# Patient Record
Sex: FEMALE | URBAN - METROPOLITAN AREA
[De-identification: names, ages, dates, MRNs, and addresses within clinical notes are randomized per-mention and may not be internally consistent; named-entity substitution may affect disease eponyms.]

---

## 2019-07-02 ENCOUNTER — INPATIENT (INPATIENT)
Facility: HOSPITAL | Age: 69
LOS: 4 days | Discharge: HOME | End: 2019-07-07
Attending: SURGERY | Admitting: SURGERY
Payer: MEDICAID

## 2019-07-02 VITALS
DIASTOLIC BLOOD PRESSURE: 75 MMHG | RESPIRATION RATE: 16 BRPM | TEMPERATURE: 98 F | WEIGHT: 169.98 LBS | SYSTOLIC BLOOD PRESSURE: 124 MMHG | OXYGEN SATURATION: 100 % | HEIGHT: 61 IN | HEART RATE: 79 BPM

## 2019-07-02 DIAGNOSIS — K81.0 ACUTE CHOLECYSTITIS: ICD-10-CM

## 2019-07-02 DIAGNOSIS — I10 ESSENTIAL (PRIMARY) HYPERTENSION: ICD-10-CM

## 2019-07-02 LAB
ALBUMIN SERPL ELPH-MCNC: 3 G/DL — LOW (ref 3.3–5)
ALP SERPL-CCNC: 100 U/L — SIGNIFICANT CHANGE UP (ref 40–120)
ALT FLD-CCNC: 91 U/L — HIGH (ref 12–78)
ANION GAP SERPL CALC-SCNC: 6 MMOL/L — SIGNIFICANT CHANGE UP (ref 5–17)
APPEARANCE UR: CLEAR — SIGNIFICANT CHANGE UP
APTT BLD: 29.5 SEC — SIGNIFICANT CHANGE UP (ref 27.5–36.3)
AST SERPL-CCNC: 233 U/L — HIGH (ref 15–37)
BASOPHILS # BLD AUTO: 0.04 K/UL — SIGNIFICANT CHANGE UP (ref 0–0.2)
BASOPHILS NFR BLD AUTO: 0.6 % — SIGNIFICANT CHANGE UP (ref 0–2)
BILIRUB SERPL-MCNC: 0.5 MG/DL — SIGNIFICANT CHANGE UP (ref 0.2–1.2)
BILIRUB UR-MCNC: NEGATIVE — SIGNIFICANT CHANGE UP
BLD GP AB SCN SERPL QL: SIGNIFICANT CHANGE UP
BUN SERPL-MCNC: 13 MG/DL — SIGNIFICANT CHANGE UP (ref 7–23)
CALCIUM SERPL-MCNC: 8.5 MG/DL — SIGNIFICANT CHANGE UP (ref 8.5–10.1)
CHLORIDE SERPL-SCNC: 101 MMOL/L — SIGNIFICANT CHANGE UP (ref 96–108)
CK MB BLD-MCNC: <1.2 % — SIGNIFICANT CHANGE UP (ref 0–3.5)
CK MB CFR SERPL CALC: <1 NG/ML — SIGNIFICANT CHANGE UP (ref 0.5–3.6)
CK SERPL-CCNC: 83 U/L — SIGNIFICANT CHANGE UP (ref 26–192)
CO2 SERPL-SCNC: 30 MMOL/L — SIGNIFICANT CHANGE UP (ref 22–31)
COLOR SPEC: YELLOW — SIGNIFICANT CHANGE UP
CREAT SERPL-MCNC: 0.95 MG/DL — SIGNIFICANT CHANGE UP (ref 0.5–1.3)
DIFF PNL FLD: NEGATIVE — SIGNIFICANT CHANGE UP
EOSINOPHIL # BLD AUTO: 0.12 K/UL — SIGNIFICANT CHANGE UP (ref 0–0.5)
EOSINOPHIL NFR BLD AUTO: 1.7 % — SIGNIFICANT CHANGE UP (ref 0–6)
GLUCOSE SERPL-MCNC: 128 MG/DL — HIGH (ref 70–99)
GLUCOSE UR QL: NEGATIVE MG/DL — SIGNIFICANT CHANGE UP
HCT VFR BLD CALC: 37.6 % — SIGNIFICANT CHANGE UP (ref 34.5–45)
HGB BLD-MCNC: 12.4 G/DL — SIGNIFICANT CHANGE UP (ref 11.5–15.5)
IMM GRANULOCYTES NFR BLD AUTO: 0.4 % — SIGNIFICANT CHANGE UP (ref 0–1.5)
INR BLD: 1 RATIO — SIGNIFICANT CHANGE UP (ref 0.88–1.16)
KETONES UR-MCNC: NEGATIVE — SIGNIFICANT CHANGE UP
LACTATE SERPL-SCNC: 2 MMOL/L — SIGNIFICANT CHANGE UP (ref 0.7–2)
LACTATE SERPL-SCNC: 2.1 MMOL/L — HIGH (ref 0.7–2)
LEUKOCYTE ESTERASE UR-ACNC: NEGATIVE — SIGNIFICANT CHANGE UP
LIDOCAIN IGE QN: 201 U/L — SIGNIFICANT CHANGE UP (ref 73–393)
LYMPHOCYTES # BLD AUTO: 0.85 K/UL — LOW (ref 1–3.3)
LYMPHOCYTES # BLD AUTO: 11.7 % — LOW (ref 13–44)
MCHC RBC-ENTMCNC: 28.1 PG — SIGNIFICANT CHANGE UP (ref 27–34)
MCHC RBC-ENTMCNC: 33 GM/DL — SIGNIFICANT CHANGE UP (ref 32–36)
MCV RBC AUTO: 85.3 FL — SIGNIFICANT CHANGE UP (ref 80–100)
MONOCYTES # BLD AUTO: 0.41 K/UL — SIGNIFICANT CHANGE UP (ref 0–0.9)
MONOCYTES NFR BLD AUTO: 5.7 % — SIGNIFICANT CHANGE UP (ref 2–14)
NEUTROPHILS # BLD AUTO: 5.8 K/UL — SIGNIFICANT CHANGE UP (ref 1.8–7.4)
NEUTROPHILS NFR BLD AUTO: 79.9 % — HIGH (ref 43–77)
NITRITE UR-MCNC: NEGATIVE — SIGNIFICANT CHANGE UP
NRBC # BLD: 0 /100 WBCS — SIGNIFICANT CHANGE UP (ref 0–0)
PH UR: 9 — HIGH (ref 5–8)
PLATELET # BLD AUTO: 245 K/UL — SIGNIFICANT CHANGE UP (ref 150–400)
POTASSIUM SERPL-MCNC: 3.7 MMOL/L — SIGNIFICANT CHANGE UP (ref 3.5–5.3)
POTASSIUM SERPL-SCNC: 3.7 MMOL/L — SIGNIFICANT CHANGE UP (ref 3.5–5.3)
PROT SERPL-MCNC: 7.2 GM/DL — SIGNIFICANT CHANGE UP (ref 6–8.3)
PROT UR-MCNC: NEGATIVE MG/DL — SIGNIFICANT CHANGE UP
PROTHROM AB SERPL-ACNC: 11.2 SEC — SIGNIFICANT CHANGE UP (ref 10–12.9)
RBC # BLD: 4.41 M/UL — SIGNIFICANT CHANGE UP (ref 3.8–5.2)
RBC # FLD: 14.4 % — SIGNIFICANT CHANGE UP (ref 10.3–14.5)
SODIUM SERPL-SCNC: 137 MMOL/L — SIGNIFICANT CHANGE UP (ref 135–145)
SP GR SPEC: 1.01 — SIGNIFICANT CHANGE UP (ref 1.01–1.02)
TROPONIN I SERPL-MCNC: <.015 NG/ML — SIGNIFICANT CHANGE UP (ref 0.01–0.04)
UROBILINOGEN FLD QL: NEGATIVE MG/DL — SIGNIFICANT CHANGE UP
WBC # BLD: 7.25 K/UL — SIGNIFICANT CHANGE UP (ref 3.8–10.5)
WBC # FLD AUTO: 7.25 K/UL — SIGNIFICANT CHANGE UP (ref 3.8–10.5)

## 2019-07-02 PROCEDURE — 99285 EMERGENCY DEPT VISIT HI MDM: CPT

## 2019-07-02 PROCEDURE — 93010 ELECTROCARDIOGRAM REPORT: CPT

## 2019-07-02 PROCEDURE — 74177 CT ABD & PELVIS W/CONTRAST: CPT | Mod: 26

## 2019-07-02 PROCEDURE — 99053 MED SERV 10PM-8AM 24 HR FAC: CPT

## 2019-07-02 PROCEDURE — 76700 US EXAM ABDOM COMPLETE: CPT | Mod: 26

## 2019-07-02 PROCEDURE — 99223 1ST HOSP IP/OBS HIGH 75: CPT

## 2019-07-02 RX ORDER — SODIUM CHLORIDE 9 MG/ML
1000 INJECTION, SOLUTION INTRAVENOUS
Refills: 0 | Status: DISCONTINUED | OUTPATIENT
Start: 2019-07-02 | End: 2019-07-03

## 2019-07-02 RX ORDER — PIPERACILLIN AND TAZOBACTAM 4; .5 G/20ML; G/20ML
3.38 INJECTION, POWDER, LYOPHILIZED, FOR SOLUTION INTRAVENOUS ONCE
Refills: 0 | Status: COMPLETED | OUTPATIENT
Start: 2019-07-02 | End: 2019-07-02

## 2019-07-02 RX ORDER — SODIUM CHLORIDE 9 MG/ML
1000 INJECTION INTRAMUSCULAR; INTRAVENOUS; SUBCUTANEOUS ONCE
Refills: 0 | Status: COMPLETED | OUTPATIENT
Start: 2019-07-02 | End: 2019-07-02

## 2019-07-02 RX ORDER — MORPHINE SULFATE 50 MG/1
2 CAPSULE, EXTENDED RELEASE ORAL EVERY 4 HOURS
Refills: 0 | Status: DISCONTINUED | OUTPATIENT
Start: 2019-07-02 | End: 2019-07-02

## 2019-07-02 RX ORDER — SIMETHICONE 80 MG/1
80 TABLET, CHEWABLE ORAL ONCE
Refills: 0 | Status: COMPLETED | OUTPATIENT
Start: 2019-07-02 | End: 2019-07-02

## 2019-07-02 RX ORDER — HYDROCHLOROTHIAZIDE 25 MG
25 TABLET ORAL DAILY
Refills: 0 | Status: DISCONTINUED | OUTPATIENT
Start: 2019-07-02 | End: 2019-07-06

## 2019-07-02 RX ORDER — HYDROMORPHONE HYDROCHLORIDE 2 MG/ML
0.5 INJECTION INTRAMUSCULAR; INTRAVENOUS; SUBCUTANEOUS
Refills: 0 | Status: DISCONTINUED | OUTPATIENT
Start: 2019-07-02 | End: 2019-07-06

## 2019-07-02 RX ORDER — PANTOPRAZOLE SODIUM 20 MG/1
40 TABLET, DELAYED RELEASE ORAL ONCE
Refills: 0 | Status: COMPLETED | OUTPATIENT
Start: 2019-07-02 | End: 2019-07-02

## 2019-07-02 RX ORDER — PIPERACILLIN AND TAZOBACTAM 4; .5 G/20ML; G/20ML
3.38 INJECTION, POWDER, LYOPHILIZED, FOR SOLUTION INTRAVENOUS EVERY 8 HOURS
Refills: 0 | Status: COMPLETED | OUTPATIENT
Start: 2019-07-02 | End: 2019-07-05

## 2019-07-02 RX ORDER — SODIUM CHLORIDE 9 MG/ML
1000 INJECTION INTRAMUSCULAR; INTRAVENOUS; SUBCUTANEOUS
Refills: 0 | Status: DISCONTINUED | OUTPATIENT
Start: 2019-07-02 | End: 2019-07-02

## 2019-07-02 RX ORDER — ONDANSETRON 8 MG/1
4 TABLET, FILM COATED ORAL EVERY 6 HOURS
Refills: 0 | Status: DISCONTINUED | OUTPATIENT
Start: 2019-07-02 | End: 2019-07-06

## 2019-07-02 RX ORDER — HEPARIN SODIUM 5000 [USP'U]/ML
5000 INJECTION INTRAVENOUS; SUBCUTANEOUS EVERY 12 HOURS
Refills: 0 | Status: DISCONTINUED | OUTPATIENT
Start: 2019-07-02 | End: 2019-07-06

## 2019-07-02 RX ADMIN — SODIUM CHLORIDE 125 MILLILITER(S): 9 INJECTION, SOLUTION INTRAVENOUS at 20:15

## 2019-07-02 RX ADMIN — PIPERACILLIN AND TAZOBACTAM 200 GRAM(S): 4; .5 INJECTION, POWDER, LYOPHILIZED, FOR SOLUTION INTRAVENOUS at 13:45

## 2019-07-02 RX ADMIN — SIMETHICONE 80 MILLIGRAM(S): 80 TABLET, CHEWABLE ORAL at 07:25

## 2019-07-02 RX ADMIN — PIPERACILLIN AND TAZOBACTAM 25 GRAM(S): 4; .5 INJECTION, POWDER, LYOPHILIZED, FOR SOLUTION INTRAVENOUS at 22:07

## 2019-07-02 RX ADMIN — SODIUM CHLORIDE 125 MILLILITER(S): 9 INJECTION INTRAMUSCULAR; INTRAVENOUS; SUBCUTANEOUS at 13:45

## 2019-07-02 RX ADMIN — SODIUM CHLORIDE 1000 MILLILITER(S): 9 INJECTION INTRAMUSCULAR; INTRAVENOUS; SUBCUTANEOUS at 09:07

## 2019-07-02 RX ADMIN — SODIUM CHLORIDE 1000 MILLILITER(S): 9 INJECTION INTRAMUSCULAR; INTRAVENOUS; SUBCUTANEOUS at 07:22

## 2019-07-02 RX ADMIN — PANTOPRAZOLE SODIUM 40 MILLIGRAM(S): 20 TABLET, DELAYED RELEASE ORAL at 07:25

## 2019-07-02 NOTE — ED ADULT NURSE REASSESSMENT NOTE - NS ED NURSE REASSESS COMMENT FT1
Assuming care from previous RN Susy NEWELL, pt is A&OX4, pt safety maintained, denies SOB, resp even and unlabored, skin warm and dry, color is normal for race, denies pain/n/v, pt aware of plan of care and proficiency determined by successful teach back demonstration. Pt does not appear to be in any distress at this time.

## 2019-07-02 NOTE — H&P ADULT - HISTORY OF PRESENT ILLNESS
Patient is a 69 year old female with PMH HTN who presents to the ER c/o 7/10 epigastric pain and RUQ pain since last night. Admits to associated nausea, denies vomiting. Reports 3 month history of similar episodes for which she did not seek care. Denies fever, chills, chest pain, sob.  Last meal last night.

## 2019-07-02 NOTE — ED ADULT NURSE REASSESSMENT NOTE - NS ED NURSE REASSESS COMMENT FT1
Report given to holding NANCY Dallas, pts history, current condition and reason for admission discussed, safety concerns addressed and reviewed, pt currently in stable condition, IV flushes for patency and site shows no signs or symptoms of infiltrate, dressing is clean dry and intact, pt is aware of plan of care. Pt education deemed successful at time of report after patient demonstrates successful teach back for proficiency.

## 2019-07-02 NOTE — ED PROVIDER NOTE - OBJECTIVE STATEMENT
Pertinent PMH/PSH/FHx/SHx and Review of Systems contained within:    70yo F w PMH of HTN, no previous surgical hx presents to ED for eval of abd pain.  Pt states she has epigastric pain, which radiates all over abdomen.  Pt had mild pain starting 3d ago, then was much worse this morning.  Denies fever, chills, CP, SOB, vomiting, diarrhea, urinary sx, recent travel, trauma, ingestion of spoiled foods.  Pt states she has experiences this pain in the past, work up negative.    No fever/chills, No photophobia/eye pain/changes in vision, No ear pain/sore throat/dysphagia, No chest pain/palpitations, no SOB/cough/wheeze/stridor, +abdominal pain, No neck/back pain, no rash, no changes in neurological status/function.

## 2019-07-02 NOTE — H&P ADULT - PROBLEM SELECTOR PLAN 1
Admit  IV antibiotics  Preop for OR today; NPO, EKG, labs  IV hydration  Pain management PRN  Antiemetics PRN  DVT ppx  Discussed with Dr. Ferrara Admit  IV antibiotics  Preop for cholecystectomy; NPO, EKG, labs  IV hydration  Pain management PRN  Antiemetics PRN  DVT ppx  Discussed with Dr. Ferrara

## 2019-07-02 NOTE — ED ADULT TRIAGE NOTE - CHIEF COMPLAINT QUOTE
pt co abdominal pain x 2 hours. denies nvd. pt reports this has happened several times before and they are never able to find anything. pt reports vegetable rice for dinner. hx of htn.

## 2019-07-02 NOTE — ED ADULT NURSE NOTE - ED STAT RN HANDOFF DETAILS
Report endorsed to oncoming RN. Safety checks compld this shift/Safety rounds completed hourly.  IV sites checked Q2+remains WDL.  Fall +skin precs in place. Any issues endorsed to oncoming RN for follow up.

## 2019-07-02 NOTE — ED PROVIDER NOTE - PHYSICAL EXAMINATION
Gen: Alert, c/o discomfort  Head: NC, AT, EOMI, normal lids/conjunctiva  ENT: normal hearing, patent oropharynx, MMM  Neck: supple, no tenderness/meningismus, FROM, Trachea midline  Pulm: Bilateral clear BS, normal resp effort, no wheeze/stridor/retractions  CV: RRR, no M/R/G, +dist pulses  Abd: soft, +epigastric TTP, ND, +BS, no guarding/rebound tenderness  Mskel: no edema/erythema/cyanosis  Skin: no rash  Neuro: no sensory/motor deficits

## 2019-07-02 NOTE — H&P ADULT - RS GEN PE MLT RESP DETAILS PC
breath sounds equal/no rhonchi/clear to auscultation bilaterally/no rales/no wheezes/respirations non-labored

## 2019-07-02 NOTE — ED PROVIDER NOTE - CLINICAL SUMMARY MEDICAL DECISION MAKING FREE TEXT BOX
Pt w abd pain, pending CT & rpt lact.  Patient care transitioned to incoming team.  All decisions regarding the progression of care will be made at their discretion.

## 2019-07-02 NOTE — ED PROVIDER NOTE - PROGRESS NOTE DETAILS
pt signed out to me from dr hernandez, pt presented with epigastric abdominal pain, ct shows possible cholecystitis, sono ordered pt admitted to surgery

## 2019-07-02 NOTE — ED PROVIDER NOTE - CARE PLAN
Principal Discharge DX:	Abdominal pain Principal Discharge DX:	Abdominal pain  Secondary Diagnosis:	Acute cholecystitis

## 2019-07-02 NOTE — ED ADULT NURSE NOTE - OBJECTIVE STATEMENT
pt received to bed 29 c/o generalized abdominal pain starting at 3 AM this morning. pt states the pain has happened before and they cannot find a cause of the pain. denies: headache, dizzy, fever, chills, n/v, chest pain, SOB, burning on urination, dysuria. pt is rubbing abdomen and moaning in pain. abdomen is nontender upon palpation. family at bedside.

## 2019-07-02 NOTE — H&P ADULT - ATTENDING COMMENTS
The recent imaging studies, (including CT scan and abdominal ultrasound) report and images were reviewed and reveal cholelithiasis, gallbladder wall thickening/edema, and pericholecystic inflammation, consistent with acute cholecystitis.  Keep NPO/IVF  Continue Zosyn  Cholecystectomy booked for tomorrow afternoon.  Repeat LFTs in AM.

## 2019-07-03 DIAGNOSIS — R94.5 ABNORMAL RESULTS OF LIVER FUNCTION STUDIES: ICD-10-CM

## 2019-07-03 LAB
ALBUMIN SERPL ELPH-MCNC: 3.1 G/DL — LOW (ref 3.3–5)
ALP SERPL-CCNC: 219 U/L — HIGH (ref 40–120)
ALT FLD-CCNC: 1104 U/L — HIGH (ref 12–78)
ANION GAP SERPL CALC-SCNC: 7 MMOL/L — SIGNIFICANT CHANGE UP (ref 5–17)
AST SERPL-CCNC: 1513 U/L — HIGH (ref 15–37)
BILIRUB DIRECT SERPL-MCNC: 1.45 MG/DL — HIGH (ref 0.05–0.2)
BILIRUB INDIRECT FLD-MCNC: 0.6 MG/DL — SIGNIFICANT CHANGE UP (ref 0.2–1)
BILIRUB SERPL-MCNC: 2.1 MG/DL — HIGH (ref 0.2–1.2)
BUN SERPL-MCNC: 8 MG/DL — SIGNIFICANT CHANGE UP (ref 7–23)
CALCIUM SERPL-MCNC: 8.9 MG/DL — SIGNIFICANT CHANGE UP (ref 8.5–10.1)
CHLORIDE SERPL-SCNC: 106 MMOL/L — SIGNIFICANT CHANGE UP (ref 96–108)
CO2 SERPL-SCNC: 27 MMOL/L — SIGNIFICANT CHANGE UP (ref 22–31)
CREAT SERPL-MCNC: 0.89 MG/DL — SIGNIFICANT CHANGE UP (ref 0.5–1.3)
GLUCOSE SERPL-MCNC: 119 MG/DL — HIGH (ref 70–99)
HCT VFR BLD CALC: 38.7 % — SIGNIFICANT CHANGE UP (ref 34.5–45)
HCV AB S/CO SERPL IA: 0.09 S/CO — SIGNIFICANT CHANGE UP (ref 0–0.99)
HCV AB SERPL-IMP: SIGNIFICANT CHANGE UP
HGB BLD-MCNC: 12.6 G/DL — SIGNIFICANT CHANGE UP (ref 11.5–15.5)
MAGNESIUM SERPL-MCNC: 2.9 MG/DL — HIGH (ref 1.6–2.6)
MCHC RBC-ENTMCNC: 28 PG — SIGNIFICANT CHANGE UP (ref 27–34)
MCHC RBC-ENTMCNC: 32.6 GM/DL — SIGNIFICANT CHANGE UP (ref 32–36)
MCV RBC AUTO: 86 FL — SIGNIFICANT CHANGE UP (ref 80–100)
NRBC # BLD: 0 /100 WBCS — SIGNIFICANT CHANGE UP (ref 0–0)
PHOSPHATE SERPL-MCNC: 3.7 MG/DL — SIGNIFICANT CHANGE UP (ref 2.5–4.5)
PLATELET # BLD AUTO: 266 K/UL — SIGNIFICANT CHANGE UP (ref 150–400)
POTASSIUM SERPL-MCNC: 3.5 MMOL/L — SIGNIFICANT CHANGE UP (ref 3.5–5.3)
POTASSIUM SERPL-SCNC: 3.5 MMOL/L — SIGNIFICANT CHANGE UP (ref 3.5–5.3)
PROT SERPL-MCNC: 7.2 GM/DL — SIGNIFICANT CHANGE UP (ref 6–8.3)
RBC # BLD: 4.5 M/UL — SIGNIFICANT CHANGE UP (ref 3.8–5.2)
RBC # FLD: 14.8 % — HIGH (ref 10.3–14.5)
SODIUM SERPL-SCNC: 140 MMOL/L — SIGNIFICANT CHANGE UP (ref 135–145)
WBC # BLD: 3.58 K/UL — LOW (ref 3.8–10.5)
WBC # FLD AUTO: 3.58 K/UL — LOW (ref 3.8–10.5)

## 2019-07-03 PROCEDURE — 74183 MRI ABD W/O CNTR FLWD CNTR: CPT | Mod: 26

## 2019-07-03 PROCEDURE — 78226 HEPATOBILIARY SYSTEM IMAGING: CPT | Mod: 26

## 2019-07-03 PROCEDURE — 99234 HOSP IP/OBS SM DT SF/LOW 45: CPT

## 2019-07-03 RX ORDER — DEXTROSE MONOHYDRATE, SODIUM CHLORIDE, AND POTASSIUM CHLORIDE 50; .745; 4.5 G/1000ML; G/1000ML; G/1000ML
1000 INJECTION, SOLUTION INTRAVENOUS
Refills: 0 | Status: DISCONTINUED | OUTPATIENT
Start: 2019-07-03 | End: 2019-07-06

## 2019-07-03 RX ADMIN — HEPARIN SODIUM 5000 UNIT(S): 5000 INJECTION INTRAVENOUS; SUBCUTANEOUS at 06:14

## 2019-07-03 RX ADMIN — PIPERACILLIN AND TAZOBACTAM 25 GRAM(S): 4; .5 INJECTION, POWDER, LYOPHILIZED, FOR SOLUTION INTRAVENOUS at 21:35

## 2019-07-03 RX ADMIN — PIPERACILLIN AND TAZOBACTAM 25 GRAM(S): 4; .5 INJECTION, POWDER, LYOPHILIZED, FOR SOLUTION INTRAVENOUS at 06:14

## 2019-07-03 RX ADMIN — HEPARIN SODIUM 5000 UNIT(S): 5000 INJECTION INTRAVENOUS; SUBCUTANEOUS at 18:23

## 2019-07-03 RX ADMIN — Medication 25 MILLIGRAM(S): at 06:15

## 2019-07-03 RX ADMIN — PIPERACILLIN AND TAZOBACTAM 25 GRAM(S): 4; .5 INJECTION, POWDER, LYOPHILIZED, FOR SOLUTION INTRAVENOUS at 14:17

## 2019-07-03 RX ADMIN — DEXTROSE MONOHYDRATE, SODIUM CHLORIDE, AND POTASSIUM CHLORIDE 100 MILLILITER(S): 50; .745; 4.5 INJECTION, SOLUTION INTRAVENOUS at 10:56

## 2019-07-03 NOTE — CONSULT NOTE ADULT - PROBLEM SELECTOR RECOMMENDATION 9
Patient is currently afebrile, normal wbc and has benign abdominal exam  More likely passed Calculus with absence of symptoms  Would continue IV antibiotics and follow LFTs closely Patient is currently afebrile, normal wbc and has benign abdominal exam  More likely passed Calculus with absence of symptoms  Would continue IV antibiotics and follow LFTs closely  Clear liquids

## 2019-07-03 NOTE — CONSULT NOTE ADULT - ASSESSMENT
68y/o female with Cholecystitis, Cholelithiasis, now with worsening LFTs ? Passed calculus vs Cholangitis

## 2019-07-03 NOTE — PROGRESS NOTE ADULT - ATTENDING COMMENTS
Patient seen and examined.  She states that she is feeling better and denies pain or nausea.  She just had her MRCP.    NAD  Abdomen - soft, non-tender, non-distended    WBC = 3.5 (7.2)  T Bili = 2.1 (0.5)  Alk Phos = 219 (100)  AST = 1513 (233)  ALT = 1104 (91)    Assessment:  69-year-old woman admitted with presumed acute cholecystitis, now with markedly elevated LFTs likely secondary to a passed stone.    Plan:  - The MRCP from today was reviewed with radiology and revealed mild intra and extrahepatic biliary dilatation which is new from her CT scan from yesterday.  There was no choledocholithiasis appreciated which overall is most consistent with a passed stone.  The gallbladder had persistent wall thickening which is unclear to be acute versus chronic.  - Will get a HIDA scan to assess for a persistent cystic duct obstruction in order to determine the timing of her cholecystectomy.  If the cystic duct obstruction has resolved, will likely plan for a semi-elective cholecystectomy once her LFTs have fully normalized.  - In the interim, keep NPO and continue Zosyn pending the results of the HIDA scan.  - F/u GI consult (Dr. Ricardo) and continue to trend LFTs.

## 2019-07-03 NOTE — CONSULT NOTE ADULT - SUBJECTIVE AND OBJECTIVE BOX
Chief Complaint:  Patient is a 69y old  Female who presents with a chief complaint of     HPI:  Patient is a 69 year old female with PMH HTN who presents to the ER c/o 7/10 epigastric pain and RUQ pain since last night. Admits to associated nausea, denies vomiting. Reports 3 month history of similar episodes for which she did not seek care. Denies fever, chills, chest pain, sob.  Last meal last night. (2019 12:55)      PMH/PSH:PAST MEDICAL & SURGICAL HISTORY:  HTN (hypertension)  No significant past surgical history      Allergies:  No Known Allergies      Medications:  dextrose 5% + sodium chloride 0.45% with potassium chloride 20 mEq/L 1000 milliLiter(s) IV Continuous <Continuous>  heparin  Injectable 5000 Unit(s) SubCutaneous every 12 hours  hydrochlorothiazide 25 milliGRAM(s) Oral daily  HYDROmorphone  Injectable 0.5 milliGRAM(s) IV Push every 3 hours PRN  ondansetron Injectable 4 milliGRAM(s) IV Push every 6 hours PRN  piperacillin/tazobactam IVPB.. 3.375 Gram(s) IV Intermittent every 8 hours      REVIEW OF SYSTEMS:  All other review of systems is negative unless indicated above.    Relevant Family History:   FAMILY HISTORY:  No pertinent family history in first degree relatives      Relevant Social History:  Denies ETOH or tobacco history    Physical Exam:    Vital Signs:  Vital Signs Last 24 Hrs  T(C): 36.7 (2019 11:00), Max: 37.3 (2019 15:03)  T(F): 98.1 (2019 11:00), Max: 99.1 (2019 15:03)  HR: 72 (2019 11:00) (72 - 77)  BP: 156/79 (2019 11:00) (117/63 - 156/79)  BP(mean): --  RR: 18 (2019 11:00) (17 - 18)  SpO2: 100% (2019 11:00) (96% - 100%)  Daily     Daily Weight in k (2019 05:29)    Constitutional: WDWN resting comfortably in bed; NAD  HEENT: NC/AT, PERRL, EOMI, anicteric sclera, no nasal discharge; uvula midline, no oropharyngeal erythema or exudates  Neck: supple; no JVD or thyromegaly  Respiratory: CTA B/L; no W/R/R, no retractions  Cardiac: +S1/S2; RRR; no M/R/G; PMI non-displaced  Gastrointestinal: soft, NT/ND; no rebound or guarding; +BS   Extremities: , no clubbing or cyanosis; no peripheral edema  Musculoskeletal:  no joint swelling, tenderness or erythema  Vascular: 2+ radial, femoral, DP/PT pulses B/L  Skin: warm, dry and intact; no rashes, wounds, or scars  Neurologic: AAOx3; CNS grossly intact; no focal deficits no asterixis, no tremor, no encephalopathy    Laboratory:                          12.6   3.58  )-----------( 266      ( 2019 06:32 )             38.7     07-    140  |  106  |  8   ----------------------------<  119<H>  3.5   |  27  |  0.89    Ca    8.9      2019 06:32  Phos  3.7       Mg     2.9     07      LIVER FUNCTIONS - ( 2019 06:32 )    TPro  7.2  /  Alb  3.1<L>  /  TBili  2.1<H>  /  DBili  1.45<H>  /  AST  1513<H>  /  ALT  1104<H>  /  AlkPhos  219<H>      PT/INR - ( 2019 05:56 )   PT: 11.2 sec;   INR: 1.00 ratio        PTT - ( 2019 05:56 )  PTT:29.5 sec  Urinalysis Basic - ( 2019 09:23 )    Color: Yellow / Appearance: Clear / S.015 / pH: x  Gluc: x / Ketone: Negative  / Bili: Negative / Urobili: Negative mg/dL   Blood: x / Protein: Negative mg/dL / Nitrite: Negative   Leuk Esterase: Negative / RBC: x / WBC x   Sq Epi: x / Non Sq Epi: x / Bacteria: x    Lipase [id number] U/L     Intake and Output    19 @ 07:01  -  19 @ 07:00  --------------------------------------------------------  IN: 1950 mL / OUT: 0 mL / NET: 1950 mL    19 @ 07:01  -  19 @ 11:31  --------------------------------------------------------  IN: 550 mL / OUT: 0 mL / NET: 550 mL        Imaging:    EXAM:  MR MRCP WAW IC                        PROCEDURE DATE:  2019      INTERPRETATION:  CLINICAL INFORMATION: Transaminitis    COMPARISON: CT and ultrasound dated 2019    PROCEDURE:     The following sequences were obtained:  1. Axial LAVA pre- and postcontrast, Dualecho in and out-of-phase, 2D   FIESTA, and T2 SSFSE  2. Coronal T2 SSFSE  3. 3D and radial MRCP    8 mls of Gadavist was administered intravenously without complication and   2 mls were discarded.    FINDINGS:     There is a 1.2 cm right hepatic cyst. The liver otherwise enhances   homogeneously. Mild intra and extrahepatic biliary dilatation is noted,   increased from previous study. The CBD measures 9 mm. No intraductal   biliary calculi are identified. The gallbladder is visualized and   contains stones. There is persistent gallbladder wall thickening.    The pancreas demonstrates normal T1 signal and homogeneous enhancement.   There is focal dilatation of the proximal pancreatic duct, measuring up   to5 mm. The distal pancreatic duct is normal in caliber.     There are renal cysts, largest measuring up to 2.7 x 1.7 cm. The spleen,   adrenal glands, and kidneys are otherwise unremarkable in appearance.      There is no retroperitoneal adenopathy. There is no ascites.      IMPRESSION:      Cholelithiasis and persistent gallbladder wall thickening.  Interval increased biliary ductal dilatation.  No choledocholithiasis.      LD HARPER M.D., ATTENDING RADIOLOGIST  This document hasbeen electronically signed. Jul  3 2019 10:04AM     < from: US Abdomen Complete (19 @ 10:44) >    EXAM:  US ABDOMINAL COMPLETE                            PROCEDURE DATE:  2019          INTERPRETATION:  CLINICAL INFORMATION: Epigastric abdominal pain    COMPARISON: CT dated 2019    TECHNIQUE: Sonography of the abdomen.     FINDINGS:    Liver: 1.4 x 1 x 1 cm hepatic cyst.    Bile ducts: Common bile duct measures 4.8 mm.     Gallbladder: Cholelithiasis. Wall thickening and edema. Indeterminate   sonographic Dhillon's sign (patient medicated).        Pancreas: Visualized portions are within normal limits.    Spleen: 7.3 cm. Within normal limits.    Right kidney: 10.3 cm. No hydronephrosis. 2.5 x 1.2 x 1.4 cm cyst.    Left kidney: 9.6 cm.  No hydronephrosis.    Ascites: None.    Aorta and IVC: Visualized portions are within normal limits.    IMPRESSION:     Cholelithiasis with gallbladder wall thickening and edema, suspicious for   acute cholecystitis.  No biliary ductal dilatation.  Nuclear medicine hepatobiliary scan may be obtained for further   assessment.                LD HARPER M.D., ATTENDING RADIOLOGIST  This document has been electronically signed. 2019 10:50AM

## 2019-07-04 DIAGNOSIS — R10.9 UNSPECIFIED ABDOMINAL PAIN: ICD-10-CM

## 2019-07-04 LAB
ALBUMIN SERPL ELPH-MCNC: 3.4 G/DL — SIGNIFICANT CHANGE UP (ref 3.3–5)
ALP SERPL-CCNC: 237 U/L — HIGH (ref 40–120)
ALT FLD-CCNC: 886 U/L — HIGH (ref 12–78)
ANION GAP SERPL CALC-SCNC: 5 MMOL/L — SIGNIFICANT CHANGE UP (ref 5–17)
AST SERPL-CCNC: 630 U/L — HIGH (ref 15–37)
BILIRUB DIRECT SERPL-MCNC: 1.39 MG/DL — HIGH (ref 0.05–0.2)
BILIRUB INDIRECT FLD-MCNC: 0.9 MG/DL — SIGNIFICANT CHANGE UP (ref 0.2–1)
BILIRUB SERPL-MCNC: 2.3 MG/DL — HIGH (ref 0.2–1.2)
BUN SERPL-MCNC: 7 MG/DL — SIGNIFICANT CHANGE UP (ref 7–23)
CALCIUM SERPL-MCNC: 9.3 MG/DL — SIGNIFICANT CHANGE UP (ref 8.5–10.1)
CHLORIDE SERPL-SCNC: 105 MMOL/L — SIGNIFICANT CHANGE UP (ref 96–108)
CO2 SERPL-SCNC: 30 MMOL/L — SIGNIFICANT CHANGE UP (ref 22–31)
CREAT SERPL-MCNC: 1.06 MG/DL — SIGNIFICANT CHANGE UP (ref 0.5–1.3)
GLUCOSE SERPL-MCNC: 129 MG/DL — HIGH (ref 70–99)
HCT VFR BLD CALC: 42.2 % — SIGNIFICANT CHANGE UP (ref 34.5–45)
HGB BLD-MCNC: 13.6 G/DL — SIGNIFICANT CHANGE UP (ref 11.5–15.5)
MAGNESIUM SERPL-MCNC: 2.6 MG/DL — SIGNIFICANT CHANGE UP (ref 1.6–2.6)
MCHC RBC-ENTMCNC: 27.8 PG — SIGNIFICANT CHANGE UP (ref 27–34)
MCHC RBC-ENTMCNC: 32.2 GM/DL — SIGNIFICANT CHANGE UP (ref 32–36)
MCV RBC AUTO: 86.3 FL — SIGNIFICANT CHANGE UP (ref 80–100)
NRBC # BLD: 0 /100 WBCS — SIGNIFICANT CHANGE UP (ref 0–0)
PHOSPHATE SERPL-MCNC: 3.5 MG/DL — SIGNIFICANT CHANGE UP (ref 2.5–4.5)
PLATELET # BLD AUTO: 284 K/UL — SIGNIFICANT CHANGE UP (ref 150–400)
POTASSIUM SERPL-MCNC: 3.2 MMOL/L — LOW (ref 3.5–5.3)
POTASSIUM SERPL-SCNC: 3.2 MMOL/L — LOW (ref 3.5–5.3)
PROT SERPL-MCNC: 8 GM/DL — SIGNIFICANT CHANGE UP (ref 6–8.3)
RBC # BLD: 4.89 M/UL — SIGNIFICANT CHANGE UP (ref 3.8–5.2)
RBC # FLD: 15 % — HIGH (ref 10.3–14.5)
SODIUM SERPL-SCNC: 140 MMOL/L — SIGNIFICANT CHANGE UP (ref 135–145)
WBC # BLD: 3.23 K/UL — LOW (ref 3.8–10.5)
WBC # FLD AUTO: 3.23 K/UL — LOW (ref 3.8–10.5)

## 2019-07-04 PROCEDURE — 99234 HOSP IP/OBS SM DT SF/LOW 45: CPT

## 2019-07-04 RX ORDER — POTASSIUM CHLORIDE 20 MEQ
40 PACKET (EA) ORAL EVERY 4 HOURS
Refills: 0 | Status: COMPLETED | OUTPATIENT
Start: 2019-07-04 | End: 2019-07-04

## 2019-07-04 RX ADMIN — DEXTROSE MONOHYDRATE, SODIUM CHLORIDE, AND POTASSIUM CHLORIDE 100 MILLILITER(S): 50; .745; 4.5 INJECTION, SOLUTION INTRAVENOUS at 21:51

## 2019-07-04 RX ADMIN — HEPARIN SODIUM 5000 UNIT(S): 5000 INJECTION INTRAVENOUS; SUBCUTANEOUS at 05:43

## 2019-07-04 RX ADMIN — Medication 25 MILLIGRAM(S): at 05:44

## 2019-07-04 RX ADMIN — PIPERACILLIN AND TAZOBACTAM 25 GRAM(S): 4; .5 INJECTION, POWDER, LYOPHILIZED, FOR SOLUTION INTRAVENOUS at 13:08

## 2019-07-04 RX ADMIN — Medication 40 MILLIEQUIVALENT(S): at 13:12

## 2019-07-04 RX ADMIN — PIPERACILLIN AND TAZOBACTAM 25 GRAM(S): 4; .5 INJECTION, POWDER, LYOPHILIZED, FOR SOLUTION INTRAVENOUS at 21:42

## 2019-07-04 RX ADMIN — Medication 40 MILLIEQUIVALENT(S): at 10:26

## 2019-07-04 RX ADMIN — PIPERACILLIN AND TAZOBACTAM 25 GRAM(S): 4; .5 INJECTION, POWDER, LYOPHILIZED, FOR SOLUTION INTRAVENOUS at 05:44

## 2019-07-04 RX ADMIN — HEPARIN SODIUM 5000 UNIT(S): 5000 INJECTION INTRAVENOUS; SUBCUTANEOUS at 17:28

## 2019-07-04 RX ADMIN — DEXTROSE MONOHYDRATE, SODIUM CHLORIDE, AND POTASSIUM CHLORIDE 100 MILLILITER(S): 50; .745; 4.5 INJECTION, SOLUTION INTRAVENOUS at 13:09

## 2019-07-04 RX ADMIN — DEXTROSE MONOHYDRATE, SODIUM CHLORIDE, AND POTASSIUM CHLORIDE 100 MILLILITER(S): 50; .745; 4.5 INJECTION, SOLUTION INTRAVENOUS at 00:14

## 2019-07-04 NOTE — PROGRESS NOTE ADULT - ATTENDING COMMENTS
Patient seen and examined.  She states that she continues to feel well and denies abdominal pain or nausea.    Afebrile VSS  NAD  Abdomen - soft, non-tender, non-distended    WBC = 3.2 (3.5, 7.2)  T Bili = 2.3 (2.1, 0.5)  Alk Phos = 237 (219, 100)  AST = 630 (1513, 233)  ALT = 886 (1104, 91)    Assessment:  69-year-old woman admitted with acute cholecystitis, now with elevated LFTs secondary to either a passed or persistent partially CBD stone.    Plan:  - The HIDA scan from yesterday was reviewed and reveals significantly delayed passage of bile into the small bowel with nonvisualization of the gallbladder.  This was felt to represent either a "partial and/or relieved CBD obstruction."  - Continue to trend LFTs.  If the bilirubin continues to go up she will need an ERCP.  - Keep NPO with IV fluids until a persistent biliary obstruction has been ruled out.  - Considering that the cystic duct is still obstructed, will plan for a cholecystectomy once her LFTs have fully resolved.  In the interim, continue Zosyn and if she clinically decompensates she will require a percutaneous cholecystostomy tube.  - Above discussed and explained to patient who is in agreement with the plan. Patient seen and examined.  She states that she continues to feel well and denies abdominal pain or nausea.    Afebrile VSS  NAD  Abdomen - soft, non-tender, non-distended    WBC = 3.2 (3.5, 7.2)  T Bili = 2.3 (2.1, 0.5)  Alk Phos = 237 (219, 100)  AST = 630 (1513, 233)  ALT = 886 (1104, 91)    Assessment:  69-year-old woman admitted with acute cholecystitis, now with elevated LFTs secondary to either a passed or persistent partially obstructing CBD stone(s).    Plan:  - The HIDA scan from yesterday was reviewed and reveals significantly delayed passage of bile into the small bowel with nonvisualization of the gallbladder.  This was felt to represent either a "partial and/or relieved CBD obstruction."  - Continue to trend LFTs.  If the bilirubin continues to go up she will need an ERCP.  - Keep NPO with IV fluids until a persistent biliary obstruction has been ruled out.  - Considering that the cystic duct is still obstructed, will plan for a cholecystectomy once her LFTs have fully resolved.  In the interim, continue Zosyn and if she clinically decompensates she will require a percutaneous cholecystostomy tube.  - Above discussed and explained to patient who is in agreement with the plan. Patient seen and examined.  She states that she continues to feel well and denies abdominal pain or nausea.    Afebrile VSS  NAD  Abdomen - soft, non-tender, non-distended    WBC = 3.2 (3.5, 7.2)  T Bili = 2.3 (2.1, 0.5)  Alk Phos = 237 (219, 100)  AST = 630 (1513, 233)  ALT = 886 (1104, 91)    Assessment:  69-year-old woman admitted with acute cholecystitis, now with elevated LFTs secondary to either a passed or persistent partially obstructing CBD stone(s).    Plan:  - The HIDA scan from yesterday was reviewed and reveals significantly delayed passage of bile into the small bowel with nonvisualization of the gallbladder.  This was felt to represent either a "partial and/or relieved CBD obstruction" with cholecystitis.  - Continue to trend LFTs.  If the bilirubin continues to go up she will need an ERCP.  - Keep NPO with IV fluids until a persistent biliary obstruction has been ruled out.  - Considering that the cystic duct is still obstructed, will plan for a cholecystectomy once her LFTs have fully resolved.  In the interim, continue Zosyn and if she clinically decompensates she will require a percutaneous cholecystostomy tube.  - Above discussed and explained to patient who is in agreement with the plan.

## 2019-07-05 ENCOUNTER — TRANSCRIPTION ENCOUNTER (OUTPATIENT)
Age: 69
End: 2019-07-05

## 2019-07-05 LAB
ALBUMIN SERPL ELPH-MCNC: 3.1 G/DL — LOW (ref 3.3–5)
ALP SERPL-CCNC: 193 U/L — HIGH (ref 40–120)
ALT FLD-CCNC: 555 U/L — HIGH (ref 12–78)
ANION GAP SERPL CALC-SCNC: 6 MMOL/L — SIGNIFICANT CHANGE UP (ref 5–17)
APTT BLD: 40.9 SEC — HIGH (ref 27.5–36.3)
AST SERPL-CCNC: 228 U/L — HIGH (ref 15–37)
BASOPHILS # BLD AUTO: 0 K/UL — SIGNIFICANT CHANGE UP (ref 0–0.2)
BASOPHILS NFR BLD AUTO: 0 % — SIGNIFICANT CHANGE UP (ref 0–2)
BILIRUB DIRECT SERPL-MCNC: 0.47 MG/DL — HIGH (ref 0.05–0.2)
BILIRUB INDIRECT FLD-MCNC: 0.5 MG/DL — SIGNIFICANT CHANGE UP (ref 0.2–1)
BILIRUB SERPL-MCNC: 1 MG/DL — SIGNIFICANT CHANGE UP (ref 0.2–1.2)
BUN SERPL-MCNC: 6 MG/DL — LOW (ref 7–23)
CALCIUM SERPL-MCNC: 8.7 MG/DL — SIGNIFICANT CHANGE UP (ref 8.5–10.1)
CHLORIDE SERPL-SCNC: 108 MMOL/L — SIGNIFICANT CHANGE UP (ref 96–108)
CO2 SERPL-SCNC: 27 MMOL/L — SIGNIFICANT CHANGE UP (ref 22–31)
CREAT SERPL-MCNC: 0.9 MG/DL — SIGNIFICANT CHANGE UP (ref 0.5–1.3)
EOSINOPHIL # BLD AUTO: 0.12 K/UL — SIGNIFICANT CHANGE UP (ref 0–0.5)
EOSINOPHIL NFR BLD AUTO: 4 % — SIGNIFICANT CHANGE UP (ref 0–6)
GLUCOSE SERPL-MCNC: 126 MG/DL — HIGH (ref 70–99)
HCT VFR BLD CALC: 38.9 % — SIGNIFICANT CHANGE UP (ref 34.5–45)
HGB BLD-MCNC: 12.4 G/DL — SIGNIFICANT CHANGE UP (ref 11.5–15.5)
INR BLD: 1.02 RATIO — SIGNIFICANT CHANGE UP (ref 0.88–1.16)
LYMPHOCYTES # BLD AUTO: 1.4 K/UL — SIGNIFICANT CHANGE UP (ref 1–3.3)
LYMPHOCYTES # BLD AUTO: 45 % — HIGH (ref 13–44)
MAGNESIUM SERPL-MCNC: 2.5 MG/DL — SIGNIFICANT CHANGE UP (ref 1.6–2.6)
MCHC RBC-ENTMCNC: 27.6 PG — SIGNIFICANT CHANGE UP (ref 27–34)
MCHC RBC-ENTMCNC: 31.9 GM/DL — LOW (ref 32–36)
MCV RBC AUTO: 86.6 FL — SIGNIFICANT CHANGE UP (ref 80–100)
MONOCYTES # BLD AUTO: 0.09 K/UL — SIGNIFICANT CHANGE UP (ref 0–0.9)
MONOCYTES NFR BLD AUTO: 3 % — SIGNIFICANT CHANGE UP (ref 2–14)
NEUTROPHILS # BLD AUTO: 1.5 K/UL — LOW (ref 1.8–7.4)
NEUTROPHILS NFR BLD AUTO: 48 % — SIGNIFICANT CHANGE UP (ref 43–77)
NRBC # BLD: SIGNIFICANT CHANGE UP /100 WBCS (ref 0–0)
PHOSPHATE SERPL-MCNC: 3.2 MG/DL — SIGNIFICANT CHANGE UP (ref 2.5–4.5)
PLATELET # BLD AUTO: 262 K/UL — SIGNIFICANT CHANGE UP (ref 150–400)
POTASSIUM SERPL-MCNC: 4 MMOL/L — SIGNIFICANT CHANGE UP (ref 3.5–5.3)
POTASSIUM SERPL-SCNC: 4 MMOL/L — SIGNIFICANT CHANGE UP (ref 3.5–5.3)
PROT SERPL-MCNC: 7.3 GM/DL — SIGNIFICANT CHANGE UP (ref 6–8.3)
PROTHROM AB SERPL-ACNC: 11.4 SEC — SIGNIFICANT CHANGE UP (ref 10–12.9)
RBC # BLD: 4.49 M/UL — SIGNIFICANT CHANGE UP (ref 3.8–5.2)
RBC # FLD: 14.9 % — HIGH (ref 10.3–14.5)
SODIUM SERPL-SCNC: 141 MMOL/L — SIGNIFICANT CHANGE UP (ref 135–145)
WBC # BLD: 3.12 K/UL — LOW (ref 3.8–10.5)
WBC # FLD AUTO: 3.12 K/UL — LOW (ref 3.8–10.5)

## 2019-07-05 PROCEDURE — 99234 HOSP IP/OBS SM DT SF/LOW 45: CPT

## 2019-07-05 RX ADMIN — DEXTROSE MONOHYDRATE, SODIUM CHLORIDE, AND POTASSIUM CHLORIDE 100 MILLILITER(S): 50; .745; 4.5 INJECTION, SOLUTION INTRAVENOUS at 21:33

## 2019-07-05 RX ADMIN — PIPERACILLIN AND TAZOBACTAM 25 GRAM(S): 4; .5 INJECTION, POWDER, LYOPHILIZED, FOR SOLUTION INTRAVENOUS at 13:16

## 2019-07-05 RX ADMIN — Medication 25 MILLIGRAM(S): at 05:51

## 2019-07-05 RX ADMIN — HEPARIN SODIUM 5000 UNIT(S): 5000 INJECTION INTRAVENOUS; SUBCUTANEOUS at 17:10

## 2019-07-05 RX ADMIN — PIPERACILLIN AND TAZOBACTAM 25 GRAM(S): 4; .5 INJECTION, POWDER, LYOPHILIZED, FOR SOLUTION INTRAVENOUS at 05:52

## 2019-07-05 RX ADMIN — HEPARIN SODIUM 5000 UNIT(S): 5000 INJECTION INTRAVENOUS; SUBCUTANEOUS at 05:51

## 2019-07-05 RX ADMIN — DEXTROSE MONOHYDRATE, SODIUM CHLORIDE, AND POTASSIUM CHLORIDE 100 MILLILITER(S): 50; .745; 4.5 INJECTION, SOLUTION INTRAVENOUS at 09:33

## 2019-07-05 NOTE — PROGRESS NOTE ADULT - ATTENDING COMMENTS
Patient seen and examined.  She states that she continues to feel well and denies abdominal pain or nausea.    Afebrile VSS  NAD  Abdomen - soft, non-tender, non-distended    WBC = 3.1 (3.2, 3.5, 7.2)  T Bili = 1.0 (2.3, 2.1, 0.5)  Alk Phos = 193 (237, 219, 100)  AST = 228 (630, 1513, 233)  ALT = 555 (886, 1104, 91)    Assessment:  69-year-old woman admitted with acute cholecystitis and subsequent choledocholithiasis, now with resolving transaminitis s/p a passed stone.    Plan:  - The bilirubin is now back to normal and her transaminases are continuing to trend down.  I explained to Ms. Manriquez that this is clinically consistent with a passed stone.  - Considering that she is leukopenic and that the cystic duct was obstructed on HIDA, will continue Zosyn and plan for a cholecystectomy tomorrow provided that her transaminases are continuing to trend down.  This has been tentatively scheduled for 8:00 AM.  - The risks of surgery, including but not limited to, bleeding, infection, damage to surrounding structures (including bile duct injury), conversion to open surgery, and hernia formation, were discussed with the patient who understands these risks and consents to the planned surgery.  All questions were answered.  - Will give clear liquids today and NPO after midnight.

## 2019-07-06 ENCOUNTER — RESULT REVIEW (OUTPATIENT)
Age: 69
End: 2019-07-06

## 2019-07-06 LAB
ALBUMIN SERPL ELPH-MCNC: 2.8 G/DL — LOW (ref 3.3–5)
ALBUMIN SERPL ELPH-MCNC: 3.2 G/DL — LOW (ref 3.3–5)
ALP SERPL-CCNC: 159 U/L — HIGH (ref 40–120)
ALP SERPL-CCNC: 169 U/L — HIGH (ref 40–120)
ALT FLD-CCNC: 430 U/L — HIGH (ref 12–78)
ALT FLD-CCNC: 463 U/L — HIGH (ref 12–78)
ANION GAP SERPL CALC-SCNC: 6 MMOL/L — SIGNIFICANT CHANGE UP (ref 5–17)
ANION GAP SERPL CALC-SCNC: 6 MMOL/L — SIGNIFICANT CHANGE UP (ref 5–17)
AST SERPL-CCNC: 142 U/L — HIGH (ref 15–37)
AST SERPL-CCNC: 301 U/L — HIGH (ref 15–37)
BILIRUB SERPL-MCNC: 0.5 MG/DL — SIGNIFICANT CHANGE UP (ref 0.2–1.2)
BILIRUB SERPL-MCNC: 0.7 MG/DL — SIGNIFICANT CHANGE UP (ref 0.2–1.2)
BUN SERPL-MCNC: 3 MG/DL — LOW (ref 7–23)
BUN SERPL-MCNC: 3 MG/DL — LOW (ref 7–23)
CALCIUM SERPL-MCNC: 7.6 MG/DL — LOW (ref 8.5–10.1)
CALCIUM SERPL-MCNC: 8.9 MG/DL — SIGNIFICANT CHANGE UP (ref 8.5–10.1)
CHLORIDE SERPL-SCNC: 107 MMOL/L — SIGNIFICANT CHANGE UP (ref 96–108)
CHLORIDE SERPL-SCNC: 112 MMOL/L — HIGH (ref 96–108)
CO2 SERPL-SCNC: 24 MMOL/L — SIGNIFICANT CHANGE UP (ref 22–31)
CO2 SERPL-SCNC: 28 MMOL/L — SIGNIFICANT CHANGE UP (ref 22–31)
CREAT SERPL-MCNC: 0.83 MG/DL — SIGNIFICANT CHANGE UP (ref 0.5–1.3)
CREAT SERPL-MCNC: 1.24 MG/DL — SIGNIFICANT CHANGE UP (ref 0.5–1.3)
GLUCOSE SERPL-MCNC: 129 MG/DL — HIGH (ref 70–99)
GLUCOSE SERPL-MCNC: 152 MG/DL — HIGH (ref 70–99)
HCT VFR BLD CALC: 37.1 % — SIGNIFICANT CHANGE UP (ref 34.5–45)
HCT VFR BLD CALC: 39.1 % — SIGNIFICANT CHANGE UP (ref 34.5–45)
HGB BLD-MCNC: 11.8 G/DL — SIGNIFICANT CHANGE UP (ref 11.5–15.5)
HGB BLD-MCNC: 12.5 G/DL — SIGNIFICANT CHANGE UP (ref 11.5–15.5)
MCHC RBC-ENTMCNC: 27.4 PG — SIGNIFICANT CHANGE UP (ref 27–34)
MCHC RBC-ENTMCNC: 28.1 PG — SIGNIFICANT CHANGE UP (ref 27–34)
MCHC RBC-ENTMCNC: 31.8 GM/DL — LOW (ref 32–36)
MCHC RBC-ENTMCNC: 32 GM/DL — SIGNIFICANT CHANGE UP (ref 32–36)
MCV RBC AUTO: 85.7 FL — SIGNIFICANT CHANGE UP (ref 80–100)
MCV RBC AUTO: 88.3 FL — SIGNIFICANT CHANGE UP (ref 80–100)
NRBC # BLD: 0 /100 WBCS — SIGNIFICANT CHANGE UP (ref 0–0)
NRBC # BLD: 0 /100 WBCS — SIGNIFICANT CHANGE UP (ref 0–0)
PLATELET # BLD AUTO: 226 K/UL — SIGNIFICANT CHANGE UP (ref 150–400)
PLATELET # BLD AUTO: 273 K/UL — SIGNIFICANT CHANGE UP (ref 150–400)
POTASSIUM SERPL-MCNC: 3.6 MMOL/L — SIGNIFICANT CHANGE UP (ref 3.5–5.3)
POTASSIUM SERPL-MCNC: 3.8 MMOL/L — SIGNIFICANT CHANGE UP (ref 3.5–5.3)
POTASSIUM SERPL-SCNC: 3.6 MMOL/L — SIGNIFICANT CHANGE UP (ref 3.5–5.3)
POTASSIUM SERPL-SCNC: 3.8 MMOL/L — SIGNIFICANT CHANGE UP (ref 3.5–5.3)
PROT SERPL-MCNC: 6.6 GM/DL — SIGNIFICANT CHANGE UP (ref 6–8.3)
PROT SERPL-MCNC: 7.2 GM/DL — SIGNIFICANT CHANGE UP (ref 6–8.3)
RBC # BLD: 4.2 M/UL — SIGNIFICANT CHANGE UP (ref 3.8–5.2)
RBC # BLD: 4.56 M/UL — SIGNIFICANT CHANGE UP (ref 3.8–5.2)
RBC # FLD: 14.8 % — HIGH (ref 10.3–14.5)
RBC # FLD: 14.9 % — HIGH (ref 10.3–14.5)
SODIUM SERPL-SCNC: 141 MMOL/L — SIGNIFICANT CHANGE UP (ref 135–145)
SODIUM SERPL-SCNC: 142 MMOL/L — SIGNIFICANT CHANGE UP (ref 135–145)
WBC # BLD: 10.73 K/UL — HIGH (ref 3.8–10.5)
WBC # BLD: 4.05 K/UL — SIGNIFICANT CHANGE UP (ref 3.8–10.5)
WBC # FLD AUTO: 10.73 K/UL — HIGH (ref 3.8–10.5)
WBC # FLD AUTO: 4.05 K/UL — SIGNIFICANT CHANGE UP (ref 3.8–10.5)

## 2019-07-06 PROCEDURE — 47562 LAPAROSCOPIC CHOLECYSTECTOMY: CPT | Mod: AS

## 2019-07-06 PROCEDURE — 47562 LAPAROSCOPIC CHOLECYSTECTOMY: CPT

## 2019-07-06 PROCEDURE — 99234 HOSP IP/OBS SM DT SF/LOW 45: CPT | Mod: 25

## 2019-07-06 RX ORDER — HYDROCHLOROTHIAZIDE 25 MG
25 TABLET ORAL DAILY
Refills: 0 | Status: DISCONTINUED | OUTPATIENT
Start: 2019-07-06 | End: 2019-07-07

## 2019-07-06 RX ORDER — OXYCODONE HYDROCHLORIDE 5 MG/1
5 TABLET ORAL EVERY 6 HOURS
Refills: 0 | Status: DISCONTINUED | OUTPATIENT
Start: 2019-07-06 | End: 2019-07-07

## 2019-07-06 RX ORDER — DEXTROSE MONOHYDRATE, SODIUM CHLORIDE, AND POTASSIUM CHLORIDE 50; .745; 4.5 G/1000ML; G/1000ML; G/1000ML
1000 INJECTION, SOLUTION INTRAVENOUS
Refills: 0 | Status: DISCONTINUED | OUTPATIENT
Start: 2019-07-06 | End: 2019-07-06

## 2019-07-06 RX ORDER — MORPHINE SULFATE 50 MG/1
2 CAPSULE, EXTENDED RELEASE ORAL EVERY 4 HOURS
Refills: 0 | Status: DISCONTINUED | OUTPATIENT
Start: 2019-07-06 | End: 2019-07-07

## 2019-07-06 RX ORDER — ONDANSETRON 8 MG/1
4 TABLET, FILM COATED ORAL EVERY 6 HOURS
Refills: 0 | Status: DISCONTINUED | OUTPATIENT
Start: 2019-07-06 | End: 2019-07-07

## 2019-07-06 RX ORDER — ONDANSETRON 8 MG/1
4 TABLET, FILM COATED ORAL ONCE
Refills: 0 | Status: DISCONTINUED | OUTPATIENT
Start: 2019-07-06 | End: 2019-07-06

## 2019-07-06 RX ORDER — HEPARIN SODIUM 5000 [USP'U]/ML
5000 INJECTION INTRAVENOUS; SUBCUTANEOUS EVERY 12 HOURS
Refills: 0 | Status: DISCONTINUED | OUTPATIENT
Start: 2019-07-06 | End: 2019-07-07

## 2019-07-06 RX ORDER — HYDROMORPHONE HYDROCHLORIDE 2 MG/ML
0.5 INJECTION INTRAMUSCULAR; INTRAVENOUS; SUBCUTANEOUS
Refills: 0 | Status: DISCONTINUED | OUTPATIENT
Start: 2019-07-06 | End: 2019-07-06

## 2019-07-06 RX ORDER — FENTANYL CITRATE 50 UG/ML
25 INJECTION INTRAVENOUS
Refills: 0 | Status: DISCONTINUED | OUTPATIENT
Start: 2019-07-06 | End: 2019-07-06

## 2019-07-06 RX ORDER — SODIUM CHLORIDE 9 MG/ML
1000 INJECTION, SOLUTION INTRAVENOUS
Refills: 0 | Status: DISCONTINUED | OUTPATIENT
Start: 2019-07-06 | End: 2019-07-06

## 2019-07-06 RX ORDER — ACETAMINOPHEN 500 MG
1000 TABLET ORAL ONCE
Refills: 0 | Status: DISCONTINUED | OUTPATIENT
Start: 2019-07-06 | End: 2019-07-06

## 2019-07-06 RX ORDER — SODIUM CHLORIDE 9 MG/ML
1000 INJECTION, SOLUTION INTRAVENOUS
Refills: 0 | Status: DISCONTINUED | OUTPATIENT
Start: 2019-07-06 | End: 2019-07-07

## 2019-07-06 RX ADMIN — HEPARIN SODIUM 5000 UNIT(S): 5000 INJECTION INTRAVENOUS; SUBCUTANEOUS at 19:19

## 2019-07-06 RX ADMIN — Medication 25 MILLIGRAM(S): at 05:47

## 2019-07-06 RX ADMIN — SODIUM CHLORIDE 75 MILLILITER(S): 9 INJECTION, SOLUTION INTRAVENOUS at 14:19

## 2019-07-06 RX ADMIN — DEXTROSE MONOHYDRATE, SODIUM CHLORIDE, AND POTASSIUM CHLORIDE 100 MILLILITER(S): 50; .745; 4.5 INJECTION, SOLUTION INTRAVENOUS at 05:37

## 2019-07-06 RX ADMIN — HYDROMORPHONE HYDROCHLORIDE 0.5 MILLIGRAM(S): 2 INJECTION INTRAMUSCULAR; INTRAVENOUS; SUBCUTANEOUS at 12:25

## 2019-07-06 RX ADMIN — HEPARIN SODIUM 5000 UNIT(S): 5000 INJECTION INTRAVENOUS; SUBCUTANEOUS at 05:46

## 2019-07-06 RX ADMIN — SODIUM CHLORIDE 75 MILLILITER(S): 9 INJECTION, SOLUTION INTRAVENOUS at 12:07

## 2019-07-06 RX ADMIN — HYDROMORPHONE HYDROCHLORIDE 0.5 MILLIGRAM(S): 2 INJECTION INTRAMUSCULAR; INTRAVENOUS; SUBCUTANEOUS at 12:07

## 2019-07-06 NOTE — PROGRESS NOTE ADULT - ATTENDING COMMENTS
Patient seen and examined.  She states that she continues to feel well and tolerated clear liquids yesterday.  She continues to deny abdominal pain or nausea.    Afebrile VSS  NAD  Abdomen - soft, non-tender, non-distended    WBC = 4.0 (3.1, 3.2, 3.5, 7.2)  T Bili = 0.7 (1.0, 2.3, 2.1, 0.5)  Alk Phos = 169 (193, 237, 219, 100)  AST = 142 (228, 630, 1513, 233)  ALT = 430 (555, 886, 1104, 91)    Assessment:  69-year-old woman admitted with acute cholecystitis and subsequent choledocholithiasis, now with resolving transaminitis s/p a presumably passed stone.    Plan:  - Her LFTs are continuing to trend down.  Will therefore proceed with a laparoscopic cholecystectomy today with intraoperative cholangiogram to assess for any small persistent intraductal stones given the persistent mild elevation in transaminases.  - The risks of surgery, including but not limited to, bleeding, infection, damage to surrounding structures (including bile duct injury), conversion to open surgery, and hernia formation, were reviewed with the patient who understands these risks and consents to the planned surgery.  All questions were answered.

## 2019-07-06 NOTE — BRIEF OPERATIVE NOTE - NSICDXBRIEFPOSTOP_GEN_ALL_CORE_FT
POST-OP DIAGNOSIS:  Cholelithiasis with acute on chronic cholecystitis 06-Jul-2019 11:54:26  Antonietta Bang

## 2019-07-07 ENCOUNTER — TRANSCRIPTION ENCOUNTER (OUTPATIENT)
Age: 69
End: 2019-07-07

## 2019-07-07 VITALS
SYSTOLIC BLOOD PRESSURE: 156 MMHG | OXYGEN SATURATION: 98 % | RESPIRATION RATE: 16 BRPM | DIASTOLIC BLOOD PRESSURE: 76 MMHG | HEART RATE: 76 BPM | TEMPERATURE: 99 F

## 2019-07-07 LAB
ALBUMIN SERPL ELPH-MCNC: 2.5 G/DL — LOW (ref 3.3–5)
ALP SERPL-CCNC: 125 U/L — HIGH (ref 40–120)
ALT FLD-CCNC: 336 U/L — HIGH (ref 12–78)
ANION GAP SERPL CALC-SCNC: 8 MMOL/L — SIGNIFICANT CHANGE UP (ref 5–17)
AST SERPL-CCNC: 159 U/L — HIGH (ref 15–37)
BASOPHILS # BLD AUTO: 0.03 K/UL — SIGNIFICANT CHANGE UP (ref 0–0.2)
BASOPHILS NFR BLD AUTO: 0.5 % — SIGNIFICANT CHANGE UP (ref 0–2)
BILIRUB SERPL-MCNC: 0.6 MG/DL — SIGNIFICANT CHANGE UP (ref 0.2–1.2)
BUN SERPL-MCNC: 5 MG/DL — LOW (ref 7–23)
CALCIUM SERPL-MCNC: 7.7 MG/DL — LOW (ref 8.5–10.1)
CHLORIDE SERPL-SCNC: 109 MMOL/L — HIGH (ref 96–108)
CO2 SERPL-SCNC: 28 MMOL/L — SIGNIFICANT CHANGE UP (ref 22–31)
CREAT SERPL-MCNC: 0.9 MG/DL — SIGNIFICANT CHANGE UP (ref 0.5–1.3)
EOSINOPHIL # BLD AUTO: 0.07 K/UL — SIGNIFICANT CHANGE UP (ref 0–0.5)
EOSINOPHIL NFR BLD AUTO: 1.3 % — SIGNIFICANT CHANGE UP (ref 0–6)
GLUCOSE SERPL-MCNC: 104 MG/DL — HIGH (ref 70–99)
HCT VFR BLD CALC: 32 % — LOW (ref 34.5–45)
HGB BLD-MCNC: 10.2 G/DL — LOW (ref 11.5–15.5)
IMM GRANULOCYTES NFR BLD AUTO: 0.4 % — SIGNIFICANT CHANGE UP (ref 0–1.5)
LYMPHOCYTES # BLD AUTO: 1.02 K/UL — SIGNIFICANT CHANGE UP (ref 1–3.3)
LYMPHOCYTES # BLD AUTO: 18.2 % — SIGNIFICANT CHANGE UP (ref 13–44)
MCHC RBC-ENTMCNC: 27.7 PG — SIGNIFICANT CHANGE UP (ref 27–34)
MCHC RBC-ENTMCNC: 31.9 GM/DL — LOW (ref 32–36)
MCV RBC AUTO: 87 FL — SIGNIFICANT CHANGE UP (ref 80–100)
MONOCYTES # BLD AUTO: 0.53 K/UL — SIGNIFICANT CHANGE UP (ref 0–0.9)
MONOCYTES NFR BLD AUTO: 9.5 % — SIGNIFICANT CHANGE UP (ref 2–14)
NEUTROPHILS # BLD AUTO: 3.92 K/UL — SIGNIFICANT CHANGE UP (ref 1.8–7.4)
NEUTROPHILS NFR BLD AUTO: 70.1 % — SIGNIFICANT CHANGE UP (ref 43–77)
NRBC # BLD: 0 /100 WBCS — SIGNIFICANT CHANGE UP (ref 0–0)
PLATELET # BLD AUTO: 208 K/UL — SIGNIFICANT CHANGE UP (ref 150–400)
POTASSIUM SERPL-MCNC: 3.4 MMOL/L — LOW (ref 3.5–5.3)
POTASSIUM SERPL-SCNC: 3.4 MMOL/L — LOW (ref 3.5–5.3)
PROT SERPL-MCNC: 5.7 GM/DL — LOW (ref 6–8.3)
RBC # BLD: 3.68 M/UL — LOW (ref 3.8–5.2)
RBC # FLD: 15.2 % — HIGH (ref 10.3–14.5)
SODIUM SERPL-SCNC: 145 MMOL/L — SIGNIFICANT CHANGE UP (ref 135–145)
WBC # BLD: 5.59 K/UL — SIGNIFICANT CHANGE UP (ref 3.8–10.5)
WBC # FLD AUTO: 5.59 K/UL — SIGNIFICANT CHANGE UP (ref 3.8–10.5)

## 2019-07-07 RX ORDER — OXYCODONE HYDROCHLORIDE 5 MG/1
1 TABLET ORAL
Qty: 14 | Refills: 0
Start: 2019-07-07

## 2019-07-07 RX ORDER — CALCIUM CARBONATE 500(1250)
1 TABLET ORAL ONCE
Refills: 0 | Status: COMPLETED | OUTPATIENT
Start: 2019-07-07 | End: 2019-07-07

## 2019-07-07 RX ORDER — POTASSIUM CHLORIDE 20 MEQ
40 PACKET (EA) ORAL EVERY 4 HOURS
Refills: 0 | Status: COMPLETED | OUTPATIENT
Start: 2019-07-07 | End: 2019-07-07

## 2019-07-07 RX ORDER — OXYCODONE HYDROCHLORIDE 5 MG/1
10 TABLET ORAL EVERY 6 HOURS
Refills: 0 | Status: DISCONTINUED | OUTPATIENT
Start: 2019-07-07 | End: 2019-07-07

## 2019-07-07 RX ADMIN — HEPARIN SODIUM 5000 UNIT(S): 5000 INJECTION INTRAVENOUS; SUBCUTANEOUS at 05:57

## 2019-07-07 RX ADMIN — Medication 1 TABLET(S): at 12:18

## 2019-07-07 RX ADMIN — Medication 40 MILLIEQUIVALENT(S): at 13:40

## 2019-07-07 RX ADMIN — Medication 25 MILLIGRAM(S): at 09:29

## 2019-07-07 NOTE — DISCHARGE NOTE PROVIDER - HOSPITAL COURSE
HPI:    Patient is a 69 year old female with PMH HTN who presents to the ER c/o 7/10 epigastric pain and RUQ pain since last night. Admits to associated nausea, denies vomiting. Reports 3 month history of similar episodes for which she did not seek care. Denies fever, chills, chest pain, sob.    Last meal last night. (02 Jul 2019 12:55)    Pt was admitted to surgical service with acute on chronic cholecystitis and subsequent transient choledocholithiasis resulting in elevated LFTs, and was followed by GI. She underwent lap christine on 7/6. She was stable for discharge on the following day with OP GI and surgical Follow up.

## 2019-07-07 NOTE — DISCHARGE NOTE NURSING/CASE MANAGEMENT/SOCIAL WORK - NSDCDPATPORTLINK_GEN_ALL_CORE
You can access the [facility] Portal, offered by [facility], by registering with the following website: [email or web address]

## 2019-07-07 NOTE — DISCHARGE NOTE PROVIDER - NSDCFUADDAPPT_GEN_ALL_CORE_FT
Please call to schedule an appointment with GI doctor in 1 week and a follow up appointment with your surgeon Dr Ferrara in 10-14 days.

## 2019-07-07 NOTE — DISCHARGE NOTE PROVIDER - NSDCFUADDINST_GEN_ALL_CORE_FT
Drink plenty of fluids to prevent constipation and fruit juices without pulp that are high in vitamin C. Rest as needed. Do not lift anything heavier than 10 pounds. You may take motrin or advil for mild pain as needed, in addition to prescribed narcotic medication. You may take over the counter stool softeners as needed. Call for any fever over 101, nausea, vomiting, severe pain, no passing of gas or bowel movement. You may shower and pat dry abdomen. Leave the white steri strips in place; they will fall off in 5-7 days.

## 2019-07-07 NOTE — PROGRESS NOTE ADULT - PROBLEM SELECTOR PROBLEM 2
Abdominal pain, unspecified abdominal location

## 2019-07-07 NOTE — DISCHARGE NOTE PROVIDER - NSDCCPCAREPLAN_GEN_ALL_CORE_FT
PRINCIPAL DISCHARGE DIAGNOSIS  Diagnosis: Acute cholecystitis  Assessment and Plan of Treatment: acute on chronic      SECONDARY DISCHARGE DIAGNOSES  Diagnosis: Acute cholecystitis  Assessment and Plan of Treatment:

## 2019-07-07 NOTE — PROGRESS NOTE ADULT - PROBLEM SELECTOR PROBLEM 1
Abnormal liver function tests

## 2019-07-07 NOTE — PROGRESS NOTE ADULT - ASSESSMENT
70y/o female with Cholecystitis, Cholelithiasis, now with worsening LFTs ? Passed calculus vs Cholangitis
68y/o female with Cholecystitis, Cholelithiasis, now with worsening LFTs ? Passed calculus vs Cholangitis
70y/o female with Cholecystitis, Cholelithiasis, now with worsening LFTs ? Passed calculus vs Cholangitis
70y/o female with Cholecystitis, Cholelithiasis, now with worsening LFTs ? Passed calculus vs Cholangitis

## 2019-07-07 NOTE — PROGRESS NOTE ADULT - SUBJECTIVE AND OBJECTIVE BOX
Patient is a 69y old  Female who presents with a chief complaint of     HPI:  Patient is a 69 year old female with PMH HTN who presents to the ER c/o 7/10 epigastric pain and RUQ pain since last night. Admits to associated nausea, denies vomiting. Reports 3 month history of similar episodes for which she did not seek care. Denies fever, chills, chest pain, sob.  Last meal last night. (02 Jul 2019 12:55)      INTERVAL HPI/OVERNIGHT EVENTS:  S/P cholecystectomy  No N/V Feels weak  Pain @ incision site    MEDICATIONS  (STANDING):  heparin  Injectable 5000 Unit(s) SubCutaneous every 12 hours  hydrochlorothiazide 25 milliGRAM(s) Oral daily  lactated ringers. 1000 milliLiter(s) (75 mL/Hr) IV Continuous <Continuous>    MEDICATIONS  (PRN):  fentaNYL    Injectable 25 MICROGram(s) IV Push every 5 minutes PRN Moderate Pain (4 - 6)  HYDROmorphone  Injectable 0.5 milliGRAM(s) IV Push every 10 minutes PRN Severe Pain (7 - 10)  morphine  - Injectable 2 milliGRAM(s) IV Push every 4 hours PRN Severe Pain (7 - 10)  ondansetron Injectable 4 milliGRAM(s) IV Push every 6 hours PRN Nausea and/or Vomiting  ondansetron Injectable 4 milliGRAM(s) IV Push once PRN Nausea and/or Vomiting  oxyCODONE    IR 5 milliGRAM(s) Oral every 6 hours PRN Moderate Pain (4 - 6)      FAMILY HISTORY:  No pertinent family history in first degree relatives      Allergies    No Known Allergies    Intolerances        PMH/PSH:  HTN (hypertension)  No pertinent past medical history  No significant past surgical history        REVIEW OF SYSTEMS:  CONSTITUTIONAL: No fever, weight loss,   EYES: No eye pain, visual disturbances, or discharge  ENMT:  No difficulty hearing, tinnitus, vertigo; No sinus or throat pain  NECK: No pain or stiffness  BREASTS: No pain, masses, or nipple discharge  RESPIRATORY: No cough, wheezing, chills or hemoptysis; No shortness of breath  CARDIOVASCULAR: No chest pain, palpitations, dizziness, or leg swelling  GASTROINTESTINAL: See above  GENITOURINARY: No dysuria, frequency, hematuria, or incontinence  NEUROLOGICAL: No headaches, memory loss, loss of strength, numbness, or tremors  SKIN: No itching, burning, rashes, or lesions   LYMPH NODES: No enlarged glands  ENDOCRINE: No heat or cold intolerance; No hair loss  MUSCULOSKELETAL: No joint pain or swelling; No muscle, back, or extremity pain  PSYCHIATRIC: No depression, anxiety, mood swings, or difficulty sleeping  HEME/LYMPH: No easy bruising, or bleeding gums  ALLERGY AND IMMUNOLOGIC: No hives or eczema    Vital Signs Last 24 Hrs  T(C): 35.4 (06 Jul 2019 15:00), Max: 37.3 (05 Jul 2019 18:21)  T(F): 95.8 (06 Jul 2019 15:00), Max: 99.1 (05 Jul 2019 18:21)  HR: 59 (06 Jul 2019 15:00) (48 - 87)  BP: 123/67 (06 Jul 2019 15:00) (95/64 - 182/85)  BP(mean): --  RR: 18 (06 Jul 2019 15:00) (10 - 18)  SpO2: 100% (06 Jul 2019 15:00) (98% - 100%)    PHYSICAL EXAM:  GENERAL: NAD, well-groomed, well-developed  HEAD:  Atraumatic, Normocephalic  EYES: EOMI, PERRLA, conjunctiva and sclera clear  NECK: Supple, No JVD, Normal thyroid  NERVOUS SYSTEM:  Alert & Oriented X3, Good concentration;   CHEST/LUNG: Clear to percussion bilaterally; No rales, rhonchi, wheezing, or rubs  HEART: Regular rate and rhythm; No murmurs, rubs, or gallops  ABDOMEN: Soft, Tender @ incision site, Nondistended; Bowel sounds present  EXTREMITIES:  2+ Peripheral Pulses, No clubbing, cyanosis, or edema  LYMPH: No lymphadenopathy noted  SKIN: No rashes or lesions    LAB  07-02 @ 05:56  amylase --   lipase 201                           11.8   10.73 )-----------( 226      ( 06 Jul 2019 12:43 )             37.1       CBC:  07-06 @ 12:43  WBC 10.73   Hgb 11.8   Hct 37.1   Plts 226  MCV 88.3  07-06 @ 05:18  WBC 4.05   Hgb 12.5   Hct 39.1   Plts 273  MCV 85.7  07-05 @ 07:18  WBC 3.12   Hgb 12.4   Hct 38.9   Plts 262  MCV 86.6  07-04 @ 06:58  WBC 3.23   Hgb 13.6   Hct 42.2   Plts 284  MCV 86.3  07-03 @ 06:32  WBC 3.58   Hgb 12.6   Hct 38.7   Plts 266  MCV 86.0  07-02 @ 05:56  WBC 7.25   Hgb 12.4   Hct 37.6   Plts 245  MCV 85.3      Chemistry:  07-06 @ 12:43  Na+ 142  K+ 3.6  Cl- 112  CO2 24  BUN 3  Cr 1.24     07-06 @ 05:18  Na+ 141  K+ 3.8  Cl- 107  CO2 28  BUN 3  Cr 0.83     07-05 @ 07:18  Na+ 141  K+ 4.0  Cl- 108  CO2 27  BUN 6  Cr 0.90     07-04 @ 06:58  Na+ 140  K+ 3.2  Cl- 105  CO2 30  BUN 7  Cr 1.06     07-03 @ 06:32  Na+ 140  K+ 3.5  Cl- 106  CO2 27  BUN 8  Cr 0.89     07-02 @ 05:56  Na+ 137  K+ 3.7  Cl- 101  CO2 30  BUN 13  Cr 0.95         Glucose, Serum: 152 mg/dL (07-06 @ 12:43)  Glucose, Serum: 129 mg/dL (07-06 @ 05:18)  Glucose, Serum: 126 mg/dL (07-05 @ 07:18)  Glucose, Serum: 129 mg/dL (07-04 @ 06:58)  Glucose, Serum: 119 mg/dL (07-03 @ 06:32)  Glucose, Serum: 128 mg/dL (07-02 @ 05:56)      06 Jul 2019 12:43    142    |  112    |  3      ----------------------------<  152    3.6     |  24     |  1.24   06 Jul 2019 05:18    141    |  107    |  3      ----------------------------<  129    3.8     |  28     |  0.83   05 Jul 2019 07:18    141    |  108    |  6      ----------------------------<  126    4.0     |  27     |  0.90   04 Jul 2019 06:58    140    |  105    |  7      ----------------------------<  129    3.2     |  30     |  1.06   03 Jul 2019 06:32    140    |  106    |  8      ----------------------------<  119    3.5     |  27     |  0.89   02 Jul 2019 05:56    137    |  101    |  13     ----------------------------<  128    3.7     |  30     |  0.95     Ca    7.6        06 Jul 2019 12:43  Ca    8.9        06 Jul 2019 05:18  Ca    8.7        05 Jul 2019 07:18  Ca    9.3        04 Jul 2019 06:58  Ca    8.9        03 Jul 2019 06:32  Ca    8.5        02 Jul 2019 05:56  Phos  3.2       05 Jul 2019 07:18  Phos  3.5       04 Jul 2019 06:58  Phos  3.7       03 Jul 2019 06:32  Mg     2.5       05 Jul 2019 07:18  Mg     2.6       04 Jul 2019 06:58  Mg     2.9       03 Jul 2019 06:32    TPro  6.6    /  Alb  2.8    /  TBili  0.5    /  DBili  x      /  AST  301    /  ALT  463    /  AlkPhos  159    06 Jul 2019 12:43  TPro  7.2    /  Alb  3.2    /  TBili  0.7    /  DBili  x      /  AST  142    /  ALT  430    /  AlkPhos  169    06 Jul 2019 05:18  TPro  7.3    /  Alb  3.1    /  TBili  1.0    /  DBili  .47    /  AST  228    /  ALT  555    /  AlkPhos  193    05 Jul 2019 07:18  TPro  8.0    /  Alb  3.4    /  TBili  2.3    /  DBili  1.39   /  AST  630    /  ALT  886    /  AlkPhos  237    04 Jul 2019 06:58  TPro  7.2    /  Alb  3.1    /  TBili  2.1    /  DBili  1.45   /  AST  1513   /  ALT  1104   /  AlkPhos  219    03 Jul 2019 06:32  TPro  7.2    /  Alb  3.0    /  TBili  0.5    /  DBili  x      /  AST  233    /  ALT  91     /  AlkPhos  100    02 Jul 2019 05:56      PT/INR - ( 05 Jul 2019 15:03 )   PT: 11.4 sec;   INR: 1.02 ratio         PTT - ( 05 Jul 2019 15:03 )  PTT:40.9 sec        CAPILLARY BLOOD GLUCOSE              RADIOLOGY & ADDITIONAL TESTS:    Imaging Personally Reviewed:  [ ] YES  [ ] NO    Consultant(s) Notes Reviewed:  [ ] YES  [ ] NO    Care Discussed with Consultants/Other Providers [ ] YES  [ ] NO
Patient is a 69y old  Female who presents with a chief complaint of     HPI:  Patient is a 69 year old female with PMH HTN who presents to the ER c/o 7/10 epigastric pain and RUQ pain since last night. Admits to associated nausea, denies vomiting. Reports 3 month history of similar episodes for which she did not seek care. Denies fever, chills, chest pain, sob.  Last meal last night. (02 Jul 2019 12:55)      INTERVAL HPI/OVERNIGHT EVENTS:  COVERING FOR DR NEAL  The patient denies melena, hematochezia, hematemesis, nausea, vomiting, abdominal pain, constipation, diarrhea, or change in bowel movements Patient is NPO    MEDICATIONS  (STANDING):  dextrose 5% + sodium chloride 0.45% with potassium chloride 20 mEq/L 1000 milliLiter(s) (100 mL/Hr) IV Continuous <Continuous>  heparin  Injectable 5000 Unit(s) SubCutaneous every 12 hours  hydrochlorothiazide 25 milliGRAM(s) Oral daily  piperacillin/tazobactam IVPB.. 3.375 Gram(s) IV Intermittent every 8 hours  potassium chloride    Tablet ER 40 milliEquivalent(s) Oral every 4 hours    MEDICATIONS  (PRN):  HYDROmorphone  Injectable 0.5 milliGRAM(s) IV Push every 3 hours PRN Pain  ondansetron Injectable 4 milliGRAM(s) IV Push every 6 hours PRN Nausea and/or Vomiting      FAMILY HISTORY:  No pertinent family history in first degree relatives      Allergies    No Known Allergies    Intolerances        PMH/PSH:  HTN (hypertension)  No pertinent past medical history  No significant past surgical history        REVIEW OF SYSTEMS:  CONSTITUTIONAL: No fever, weight loss, or fatigue  EYES: No eye pain, visual disturbances, or discharge  ENMT:  No difficulty hearing, tinnitus, vertigo; No sinus or throat pain  NECK: No pain or stiffness  BREASTS: No pain, masses, or nipple discharge  RESPIRATORY: No cough, wheezing, chills or hemoptysis; No shortness of breath  CARDIOVASCULAR: No chest pain, palpitations, dizziness, or leg swelling  GASTROINTESTINAL: See above  GENITOURINARY: No dysuria, frequency, hematuria, or incontinence  NEUROLOGICAL: No headaches, memory loss, loss of strength, numbness, or tremors  SKIN: No itching, burning, rashes, or lesions   LYMPH NODES: No enlarged glands  ENDOCRINE: No heat or cold intolerance; No hair loss  MUSCULOSKELETAL: No joint pain or swelling; No muscle, back, or extremity pain  PSYCHIATRIC: No depression, anxiety, mood swings, or difficulty sleeping  HEME/LYMPH: No easy bruising, or bleeding gums  ALLERGY AND IMMUNOLOGIC: No hives or eczema    Vital Signs Last 24 Hrs  T(C): 36.4 (04 Jul 2019 06:06), Max: 37.5 (03 Jul 2019 17:41)  T(F): 97.5 (04 Jul 2019 06:06), Max: 99.5 (03 Jul 2019 17:41)  HR: 68 (04 Jul 2019 06:06) (63 - 72)  BP: 125/69 (04 Jul 2019 06:06) (125/69 - 156/79)  BP(mean): --  RR: 18 (04 Jul 2019 06:06) (18 - 19)  SpO2: 100% (04 Jul 2019 06:06) (100% - 100%)    PHYSICAL EXAM:  GENERAL: NAD, well-groomed, well-developed  HEAD:  Atraumatic, Normocephalic  EYES: EOMI, PERRLA, conjunctiva and sclera clear  NECK: Supple, No JVD, Normal thyroid  NERVOUS SYSTEM:  Alert & Oriented X3, Good concentration; Motor Strength 5/5 B/L upper and lower extremities;   CHEST/LUNG: Clear to percussion bilaterally; No rales, rhonchi, wheezing, or rubs  HEART: Regular rate and rhythm; No murmurs, rubs, or gallops  ABDOMEN: Soft, Nontender, Nondistended; Bowel sounds present  EXTREMITIES:  2+ Peripheral Pulses, No clubbing, cyanosis, or edema  LYMPH: No lymphadenopathy noted  SKIN: No rashes or lesions    LAB  07-02 @ 05:56  amylase --   lipase 201                           13.6   3.23  )-----------( 284      ( 04 Jul 2019 06:58 )             42.2       CBC:  07-04 @ 06:58  WBC 3.23   Hgb 13.6   Hct 42.2   Plts 284  MCV 86.3  07-03 @ 06:32  WBC 3.58   Hgb 12.6   Hct 38.7   Plts 266  MCV 86.0  07-02 @ 05:56  WBC 7.25   Hgb 12.4   Hct 37.6   Plts 245  MCV 85.3      Chemistry:  07-04 @ 06:58  Na+ 140  K+ 3.2  Cl- 105  CO2 30  BUN 7  Cr 1.06     07-03 @ 06:32  Na+ 140  K+ 3.5  Cl- 106  CO2 27  BUN 8  Cr 0.89     07-02 @ 05:56  Na+ 137  K+ 3.7  Cl- 101  CO2 30  BUN 13  Cr 0.95         Glucose, Serum: 129 mg/dL (07-04 @ 06:58)  Glucose, Serum: 119 mg/dL (07-03 @ 06:32)  Glucose, Serum: 128 mg/dL (07-02 @ 05:56)      04 Jul 2019 06:58    140    |  105    |  7      ----------------------------<  129    3.2     |  30     |  1.06   03 Jul 2019 06:32    140    |  106    |  8      ----------------------------<  119    3.5     |  27     |  0.89   02 Jul 2019 05:56    137    |  101    |  13     ----------------------------<  128    3.7     |  30     |  0.95     Ca    9.3        04 Jul 2019 06:58  Ca    8.9        03 Jul 2019 06:32  Ca    8.5        02 Jul 2019 05:56  Phos  3.5       04 Jul 2019 06:58  Phos  3.7       03 Jul 2019 06:32  Mg     2.6       04 Jul 2019 06:58  Mg     2.9       03 Jul 2019 06:32    TPro  8.0    /  Alb  3.4    /  TBili  2.3    /  DBili  1.39   /  AST  630    /  ALT  886    /  AlkPhos  237    04 Jul 2019 06:58  TPro  7.2    /  Alb  3.1    /  TBili  2.1    /  DBili  1.45   /  AST  1513   /  ALT  1104   /  AlkPhos  219    03 Jul 2019 06:32  TPro  7.2    /  Alb  3.0    /  TBili  0.5    /  DBili  x      /  AST  233    /  ALT  91     /  AlkPhos  100    02 Jul 2019 05:56              CAPILLARY BLOOD GLUCOSE      POCT Blood Glucose.: 115 mg/dL (03 Jul 2019 13:26)          RADIOLOGY & ADDITIONAL TESTS:    Imaging Personally Reviewed:  [ ] YES  [ ] NO    Consultant(s) Notes Reviewed:  [ ] YES  [ ] NO    Care Discussed with Consultants/Other Providers [ ] YES  [ ] NO
Patient is a 69y old  Female who presents with a chief complaint of abdominal pain (07 Jul 2019 13:47)      HPI:  Patient is a 69 year old female with PMH HTN who presents to the ER c/o 7/10 epigastric pain and RUQ pain since last night. Admits to associated nausea, denies vomiting. Reports 3 month history of similar episodes for which she did not seek care. Denies fever, chills, chest pain, sob.  Last meal last night. (02 Jul 2019 12:55)      INTERVAL HPI/OVERNIGHT EVENTS:  The patient denies melena, hematochezia, hematemesis, nausea, vomiting, abdominal pain, constipation, diarrhea, or change in bowel movements Patient tolerating regular diet    MEDICATIONS  (STANDING):  heparin  Injectable 5000 Unit(s) SubCutaneous every 12 hours  hydrochlorothiazide 25 milliGRAM(s) Oral daily    MEDICATIONS  (PRN):  oxyCODONE    IR 10 milliGRAM(s) Oral every 6 hours PRN Severe Pain (7 - 10)  oxyCODONE    IR 5 milliGRAM(s) Oral every 6 hours PRN Moderate Pain (4 - 6)      FAMILY HISTORY:  No pertinent family history in first degree relatives      Allergies    No Known Allergies    Intolerances        PMH/PSH:  HTN (hypertension)  No pertinent past medical history  No significant past surgical history        REVIEW OF SYSTEMS:  CONSTITUTIONAL: No fever, weight loss, or fatigue  EYES: No eye pain, visual disturbances, or discharge  ENMT:  No difficulty hearing, tinnitus, vertigo; No sinus or throat pain  NECK: No pain or stiffness  BREASTS: No pain, masses, or nipple discharge  RESPIRATORY: No cough, wheezing, chills or hemoptysis; No shortness of breath  CARDIOVASCULAR: No chest pain, palpitations, dizziness, or leg swelling  GASTROINTESTINAL: See above  GENITOURINARY: No dysuria, frequency, hematuria, or incontinence  NEUROLOGICAL: No headaches, memory loss, loss of strength, numbness, or tremors  SKIN: No itching, burning, rashes, or lesions   LYMPH NODES: No enlarged glands  ENDOCRINE: No heat or cold intolerance; No hair loss  MUSCULOSKELETAL: No joint pain or swelling; No muscle, back, or extremity pain  PSYCHIATRIC: No depression, anxiety, mood swings, or difficulty sleeping  HEME/LYMPH: No easy bruising, or bleeding gums  ALLERGY AND IMMUNOLOGIC: No hives or eczema    Vital Signs Last 24 Hrs  T(C): 37 (07 Jul 2019 11:46), Max: 37.3 (07 Jul 2019 09:54)  T(F): 98.6 (07 Jul 2019 11:46), Max: 99.2 (07 Jul 2019 09:54)  HR: 78 (07 Jul 2019 11:46) (67 - 81)  BP: 140/60 (07 Jul 2019 11:46) (113/58 - 140/60)  BP(mean): --  RR: 18 (07 Jul 2019 11:46) (17 - 18)  SpO2: 99% (07 Jul 2019 11:46) (96% - 100%)    PHYSICAL EXAM:  GENERAL: NAD, well-groomed, well-developed  HEAD:  Atraumatic, Normocephalic  EYES: EOMI, PERRLA, conjunctiva and sclera clear  NECK: Supple, No JVD, Normal thyroid  NERVOUS SYSTEM:  Alert & Oriented X3, Good concentration; Motor Strength 5/5 B/L upper and lower extremities;  CHEST/LUNG: Clear to percussion bilaterally; No rales, rhonchi, wheezing, or rubs  HEART: Regular rate and rhythm; No murmurs, rubs, or gallops  ABDOMEN: Soft, Nontender, Nondistended; Bowel sounds present  EXTREMITIES:  2+ Peripheral Pulses, No clubbing, cyanosis, or edema  LYMPH: No lymphadenopathy noted  SKIN: No rashes or lesions    LAB                          10.2   5.59  )-----------( 208      ( 07 Jul 2019 08:01 )             32.0       CBC:  07-07 @ 08:01  WBC 5.59   Hgb 10.2   Hct 32.0   Plts 208  MCV 87.0  07-06 @ 12:43  WBC 10.73   Hgb 11.8   Hct 37.1   Plts 226  MCV 88.3  07-06 @ 05:18  WBC 4.05   Hgb 12.5   Hct 39.1   Plts 273  MCV 85.7  07-05 @ 07:18  WBC 3.12   Hgb 12.4   Hct 38.9   Plts 262  MCV 86.6  07-04 @ 06:58  WBC 3.23   Hgb 13.6   Hct 42.2   Plts 284  MCV 86.3  07-03 @ 06:32  WBC 3.58   Hgb 12.6   Hct 38.7   Plts 266  MCV 86.0  07-02 @ 05:56  WBC 7.25   Hgb 12.4   Hct 37.6   Plts 245  MCV 85.3      Chemistry:  07-07 @ 08:01  Na+ 145  K+ 3.4  Cl- 109  CO2 28  BUN 5  Cr 0.90     07-06 @ 12:43  Na+ 142  K+ 3.6  Cl- 112  CO2 24  BUN 3  Cr 1.24     07-06 @ 05:18  Na+ 141  K+ 3.8  Cl- 107  CO2 28  BUN 3  Cr 0.83     07-05 @ 07:18  Na+ 141  K+ 4.0  Cl- 108  CO2 27  BUN 6  Cr 0.90     07-04 @ 06:58  Na+ 140  K+ 3.2  Cl- 105  CO2 30  BUN 7  Cr 1.06     07-03 @ 06:32  Na+ 140  K+ 3.5  Cl- 106  CO2 27  BUN 8  Cr 0.89     07-02 @ 05:56  Na+ 137  K+ 3.7  Cl- 101  CO2 30  BUN 13  Cr 0.95         Glucose, Serum: 104 mg/dL (07-07 @ 08:01)  Glucose, Serum: 152 mg/dL (07-06 @ 12:43)  Glucose, Serum: 129 mg/dL (07-06 @ 05:18)  Glucose, Serum: 126 mg/dL (07-05 @ 07:18)  Glucose, Serum: 129 mg/dL (07-04 @ 06:58)  Glucose, Serum: 119 mg/dL (07-03 @ 06:32)  Glucose, Serum: 128 mg/dL (07-02 @ 05:56)      07 Jul 2019 08:01    145    |  109    |  5      ----------------------------<  104    3.4     |  28     |  0.90   06 Jul 2019 12:43    142    |  112    |  3      ----------------------------<  152    3.6     |  24     |  1.24   06 Jul 2019 05:18    141    |  107    |  3      ----------------------------<  129    3.8     |  28     |  0.83   05 Jul 2019 07:18    141    |  108    |  6      ----------------------------<  126    4.0     |  27     |  0.90   04 Jul 2019 06:58    140    |  105    |  7      ----------------------------<  129    3.2     |  30     |  1.06   03 Jul 2019 06:32    140    |  106    |  8      ----------------------------<  119    3.5     |  27     |  0.89   02 Jul 2019 05:56    137    |  101    |  13     ----------------------------<  128    3.7     |  30     |  0.95     Ca    7.7        07 Jul 2019 08:01  Ca    7.6        06 Jul 2019 12:43  Ca    8.9        06 Jul 2019 05:18  Ca    8.7        05 Jul 2019 07:18  Ca    9.3        04 Jul 2019 06:58  Ca    8.9        03 Jul 2019 06:32  Ca    8.5        02 Jul 2019 05:56  Phos  3.2       05 Jul 2019 07:18  Phos  3.5       04 Jul 2019 06:58  Phos  3.7       03 Jul 2019 06:32  Mg     2.5       05 Jul 2019 07:18  Mg     2.6       04 Jul 2019 06:58  Mg     2.9       03 Jul 2019 06:32    TPro  5.7    /  Alb  2.5    /  TBili  0.6    /  DBili  x      /  AST  159    /  ALT  336    /  AlkPhos  125    07 Jul 2019 08:01  TPro  6.6    /  Alb  2.8    /  TBili  0.5    /  DBili  x      /  AST  301    /  ALT  463    /  AlkPhos  159    06 Jul 2019 12:43  TPro  7.2    /  Alb  3.2    /  TBili  0.7    /  DBili  x      /  AST  142    /  ALT  430    /  AlkPhos  169    06 Jul 2019 05:18  TPro  7.3    /  Alb  3.1    /  TBili  1.0    /  DBili  .47    /  AST  228    /  ALT  555    /  AlkPhos  193    05 Jul 2019 07:18  TPro  8.0    /  Alb  3.4    /  TBili  2.3    /  DBili  1.39   /  AST  630    /  ALT  886    /  AlkPhos  237    04 Jul 2019 06:58  TPro  7.2    /  Alb  3.1    /  TBili  2.1    /  DBili  1.45   /  AST  1513   /  ALT  1104   /  AlkPhos  219    03 Jul 2019 06:32  TPro  7.2    /  Alb  3.0    /  TBili  0.5    /  DBili  x      /  AST  233    /  ALT  91     /  AlkPhos  100    02 Jul 2019 05:56              CAPILLARY BLOOD GLUCOSE              RADIOLOGY & ADDITIONAL TESTS:    Imaging Personally Reviewed:  [ ] YES  [ ] NO    Consultant(s) Notes Reviewed:  [ ] YES  [ ] NO    Care Discussed with Consultants/Other Providers [ ] YES  [ ] NO
Patient seen and examined at bedside in no distress.  Reports mild right-sided abdominal pain. Denies nausea/vomiting.  Denies fever, chills, chest pain, sob.    Vital Signs Last 24 Hrs  T(F): 97.5 (07-03-19 @ 05:29), Max: 99.1 (07-02-19 @ 15:03)  HR: 75 (07-03-19 @ 05:29)  BP: 137/72 (07-03-19 @ 05:29)  RR: 17 (07-03-19 @ 05:29)  SpO2: 100% (07-03-19 @ 05:29)    GENERAL: Alert, oriented, NAD  CHEST/LUNG: Clear to auscultation bilaterally, respirations nonlabored  HEART: S1S2, RRR  ABDOMEN: + Bowel sounds, soft, nondistended, TTP RUQ, no guarding/rigidity  EXTREMITIES: No pedal edema b/l     LABS:  Remainder of AM labs reviewed     TPro  7.2  /  Alb  3.1<L>  /  TBili  2.1<H>  /  DBili  1.45<H>  /  AST  1513<H>  /  ALT  1104<H>  /  AlkPhos  219<H>  07-03    PT/INR - ( 02 Jul 2019 05:56 )   PT: 11.2 sec;   INR: 1.00 ratio         PTT - ( 02 Jul 2019 05:56 )  PTT:29.5 sec    A/P: 69F PMH HTN a/w acute cholecystitis, cholelithiasis, now with elevated t bili/LFTs   - OR canceled for today, MRCP ordered   - GI consult called   - continue NPO for now  - antibiotics  - pain management PRN  - antiemetics PRN   - discussed with Dr. Ferrara
Postoperative Day #: 0  Pt seen and examined at bedside, no complaints, states abdominal pain is adequately controlled, denies n/v, tolerating clear liquids. Ambulated and voided without issues. Using I/S.       T(F): 97 (07-06-19 @ 17:19), Max: 98.3 (07-06-19 @ 02:04)  HR: 69 (07-06-19 @ 17:19) (48 - 87)  BP: 113/58 (07-06-19 @ 17:19) (95/64 - 182/85)  RR: 17 (07-06-19 @ 17:19) (10 - 18)  SpO2: 96% (07-06-19 @ 17:19) (96% - 100%)  Wt(kg): --  CAPILLARY BLOOD GLUCOSE    PHYSICAL EXAM:  General: NAD, WDWN  Neuro:  Alert & oriented x 3  HEENT: NCAT, EOMI, conjunctiva clear  CV: +S1+S2 regular rate and rhythm  Lung: clear to ausculation bilaterally, respirations nonlabored, good inspiratory effort  Abdomen: mildly distended, incisions with dressing c/d/i, +BS, soft, +incisional ttp  Extremities: no pedal edema or calf tenderness noted     LABS:                        11.8   10.73 )-----------( 226      ( 06 Jul 2019 12:43 )             37.1     07-06    142  |  112<H>  |  3<L>  ----------------------------<  152<H>  3.6   |  24  |  1.24    Ca    7.6<L>      06 Jul 2019 12:43  Phos  3.2     07-05  Mg     2.5     07-05    TPro  6.6  /  Alb  2.8<L>  /  TBili  0.5  /  DBili  x   /  AST  301<H>  /  ALT  463<H>  /  AlkPhos  159<H>  07-06    PT/INR - ( 05 Jul 2019 15:03 )   PT: 11.4 sec;   INR: 1.02 ratio         PTT - ( 05 Jul 2019 15:03 )  PTT:40.9 sec  I&O's Detail    05 Jul 2019 07:01  -  06 Jul 2019 07:00  --------------------------------------------------------  IN:    dextrose 5% + sodium chloride 0.45% with potassium chloride 20 mEq/L: 1000 mL  Total IN: 1000 mL    OUT:  Total OUT: 0 mL    Total NET: 1000 mL            Impression: 69y Female POD#0 S/P lap christine- Stable    PMH   HTN (hypertension)  No pertinent past medical history      Plan:  -continue CLD, aat   -d/c IVF in am  -Increase activity with PT, OOB, Ambulate  -educated on proper incentive spirometry use  -continue local wound care  -f/u AM labs  -pain management  -DVT/GI PPX
Pre-operative Note    - Pre-operative Diagnosis: Acute cholecystitis    - Procedure: Laparoscopic cholecystectomy, possible open    - Labs:                        12.6   3.58  )-----------( 266      ( 03 Jul 2019 06:32 )             38.7     07-03    140  |  106  |  8   ----------------------------<  119<H>  3.5   |  27  |  0.89    Ca    8.9      03 Jul 2019 06:32    TPro  7.2  /  Alb  3.0<L>  /  TBili  0.5  /  DBili  x   /  AST  233<H>  /  ALT  91<H>  /  AlkPhos  100  07-02    PT/INR - ( 02 Jul 2019 05:56 )   PT: 11.2 sec;   INR: 1.00 ratio         PTT - ( 02 Jul 2019 05:56 )  PTT:29.5 sec    Type & Screen #1: O POS    - Blood: Not needed.     - Orders:  > NPO   > Perioperative zosyn    - Permits:  > Consent to be obtained by surgeon  > Case scheduled with OR for 1PM today
Patient is a 69y old  Female who presents with a chief complaint of     HPI:  Patient is a 69 year old female with PMH HTN who presents to the ER c/o 7/10 epigastric pain and RUQ pain since last night. Admits to associated nausea, denies vomiting. Reports 3 month history of similar episodes for which she did not seek care. Denies fever, chills, chest pain, sob.  Last meal last night. (02 Jul 2019 12:55)      INTERVAL HPI/OVERNIGHT EVENTS:  The patient denies melena, hematochezia, hematemesis, nausea, vomiting, abdominal pain, constipation, diarrhea, or change in bowel movements Tolerated clear liquids    MEDICATIONS  (STANDING):  dextrose 5% + sodium chloride 0.45% with potassium chloride 20 mEq/L 1000 milliLiter(s) (100 mL/Hr) IV Continuous <Continuous>  heparin  Injectable 5000 Unit(s) SubCutaneous every 12 hours  hydrochlorothiazide 25 milliGRAM(s) Oral daily    MEDICATIONS  (PRN):  HYDROmorphone  Injectable 0.5 milliGRAM(s) IV Push every 3 hours PRN Pain  ondansetron Injectable 4 milliGRAM(s) IV Push every 6 hours PRN Nausea and/or Vomiting      FAMILY HISTORY:  No pertinent family history in first degree relatives      Allergies    No Known Allergies    Intolerances        PMH/PSH:  HTN (hypertension)  No pertinent past medical history  No significant past surgical history        REVIEW OF SYSTEMS:  CONSTITUTIONAL: No fever, weight loss, or fatigue  EYES: No eye pain, visual disturbances, or discharge  ENMT:  No difficulty hearing, tinnitus, vertigo; No sinus or throat pain  NECK: No pain or stiffness  BREASTS: No pain, masses, or nipple discharge  RESPIRATORY: No cough, wheezing, chills or hemoptysis; No shortness of breath  CARDIOVASCULAR: No chest pain, palpitations, dizziness, or leg swelling  GASTROINTESTINAL: See above  GENITOURINARY: No dysuria, frequency, hematuria, or incontinence  NEUROLOGICAL: No headaches, memory loss, loss of strength, numbness, or tremors  SKIN: No itching, burning, rashes, or lesions   LYMPH NODES: No enlarged glands  ENDOCRINE: No heat or cold intolerance; No hair loss  MUSCULOSKELETAL: No joint pain or swelling; No muscle, back, or extremity pain  PSYCHIATRIC: No depression, anxiety, mood swings, or difficulty sleeping  HEME/LYMPH: No easy bruising, or bleeding gums  ALLERGY AND IMMUNOLOGIC: No hives or eczema    Vital Signs Last 24 Hrs  T(C): 36.4 (05 Jul 2019 11:05), Max: 36.8 (04 Jul 2019 17:25)  T(F): 97.6 (05 Jul 2019 11:05), Max: 98.2 (04 Jul 2019 17:25)  HR: 62 (05 Jul 2019 11:05) (62 - 72)  BP: 143/57 (05 Jul 2019 11:05) (125/50 - 143/57)  BP(mean): --  RR: 18 (05 Jul 2019 11:05) (18 - 18)  SpO2: 98% (05 Jul 2019 11:05) (98% - 100%)    PHYSICAL EXAM:  GENERAL: NAD, well-groomed, well-developed  HEAD:  Atraumatic, Normocephalic  EYES: EOMI, PERRLA, conjunctiva and sclera clear  NECK: Supple, No JVD, Normal thyroid  NERVOUS SYSTEM:  Alert & Oriented X3, Good concentration; Motor Strength 5/5 B/L upper and lower extremities;   CHEST/LUNG: Clear to percussion bilaterally; No rales, rhonchi, wheezing, or rubs  HEART: Regular rate and rhythm; No murmurs, rubs, or gallops  ABDOMEN: Soft, Nontender, Nondistended; Bowel sounds present  EXTREMITIES:  2+ Peripheral Pulses, No clubbing, cyanosis, or edema  LYMPH: No lymphadenopathy noted  SKIN: No rashes or lesions    LAB  07-02 @ 05:56  amylase --   lipase 201                           12.4   3.12  )-----------( 262      ( 05 Jul 2019 07:18 )             38.9       CBC:  07-05 @ 07:18  WBC 3.12   Hgb 12.4   Hct 38.9   Plts 262  MCV 86.6  07-04 @ 06:58  WBC 3.23   Hgb 13.6   Hct 42.2   Plts 284  MCV 86.3  07-03 @ 06:32  WBC 3.58   Hgb 12.6   Hct 38.7   Plts 266  MCV 86.0  07-02 @ 05:56  WBC 7.25   Hgb 12.4   Hct 37.6   Plts 245  MCV 85.3      Chemistry:  07-05 @ 07:18  Na+ 141  K+ 4.0  Cl- 108  CO2 27  BUN 6  Cr 0.90     07-04 @ 06:58  Na+ 140  K+ 3.2  Cl- 105  CO2 30  BUN 7  Cr 1.06     07-03 @ 06:32  Na+ 140  K+ 3.5  Cl- 106  CO2 27  BUN 8  Cr 0.89     07-02 @ 05:56  Na+ 137  K+ 3.7  Cl- 101  CO2 30  BUN 13  Cr 0.95         Glucose, Serum: 126 mg/dL (07-05 @ 07:18)  Glucose, Serum: 129 mg/dL (07-04 @ 06:58)  Glucose, Serum: 119 mg/dL (07-03 @ 06:32)  Glucose, Serum: 128 mg/dL (07-02 @ 05:56)      05 Jul 2019 07:18    141    |  108    |  6      ----------------------------<  126    4.0     |  27     |  0.90   04 Jul 2019 06:58    140    |  105    |  7      ----------------------------<  129    3.2     |  30     |  1.06   03 Jul 2019 06:32    140    |  106    |  8      ----------------------------<  119    3.5     |  27     |  0.89   02 Jul 2019 05:56    137    |  101    |  13     ----------------------------<  128    3.7     |  30     |  0.95     Ca    8.7        05 Jul 2019 07:18  Ca    9.3        04 Jul 2019 06:58  Ca    8.9        03 Jul 2019 06:32  Ca    8.5        02 Jul 2019 05:56  Phos  3.2       05 Jul 2019 07:18  Phos  3.5       04 Jul 2019 06:58  Phos  3.7       03 Jul 2019 06:32  Mg     2.5       05 Jul 2019 07:18  Mg     2.6       04 Jul 2019 06:58  Mg     2.9       03 Jul 2019 06:32    TPro  7.3    /  Alb  3.1    /  TBili  1.0    /  DBili  .47    /  AST  228    /  ALT  555    /  AlkPhos  193    05 Jul 2019 07:18  TPro  8.0    /  Alb  3.4    /  TBili  2.3    /  DBili  1.39   /  AST  630    /  ALT  886    /  AlkPhos  237    04 Jul 2019 06:58  TPro  7.2    /  Alb  3.1    /  TBili  2.1    /  DBili  1.45   /  AST  1513   /  ALT  1104   /  AlkPhos  219    03 Jul 2019 06:32  TPro  7.2    /  Alb  3.0    /  TBili  0.5    /  DBili  x      /  AST  233    /  ALT  91     /  AlkPhos  100    02 Jul 2019 05:56      PT/INR - ( 05 Jul 2019 15:03 )   PT: 11.4 sec;   INR: 1.02 ratio         PTT - ( 05 Jul 2019 15:03 )  PTT:40.9 sec        CAPILLARY BLOOD GLUCOSE              RADIOLOGY & ADDITIONAL TESTS:    Imaging Personally Reviewed:  [ ] YES  [ ] NO    Consultant(s) Notes Reviewed:  [ ] YES  [ ] NO    Care Discussed with Consultants/Other Providers [ ] YES  [ ] NO

## 2019-07-07 NOTE — PROGRESS NOTE ADULT - ATTENDING COMMENTS
Patient seen and examined.  She states that she is feeling well and denies pain or nausea.  She is tolerating a regular diet.    AVSS  NAD  Abdomen - soft, mildly distended, appropriate rocío-incisional tenderness.  Incisions clean with steri-strips.    T Bili = 0.6 (0.5, 0.7, 1.0, 2.3, 2.1, 0.5)  Alk Phos = 125 (159, 169, 193, 237, 219, 100)  AST = 159 (301, 142, 228, 630, 1513, 233)  ALT = 336 (463, 430, 555, 886, 1104, 91)    Assessment:  69-year-old woman admitted with acute on chronic cholecystitis and subsequent transient choledocholithiasis resulting in elevated LFTs, now POD 1 s/p laparoscopic cholecystectomy with persistently downtrending LFTs.    Plan:  - She is stable for discharge home today as long as okay from GI standpoint.  Will need outpatient GI follow-up to assess for normalization of LFTs.  - Replete jordy.  - Post-op instructions and follow-up information were reviewed with the patient.

## 2019-07-07 NOTE — PROGRESS NOTE ADULT - PROVIDER SPECIALTY LIST ADULT
Gastroenterology
Gastroenterology
Surgery
Gastroenterology
Gastroenterology

## 2019-07-07 NOTE — PROGRESS NOTE ADULT - PROBLEM SELECTOR PLAN 1
2.3 / 630 / 886 / 237 --- > 1.0 / 228 / 555 / 193  --> 2.8/301/463/159 S/P Charmaine  - Now off antibiotics  - f/u labs
2.3 / 630 / 886 / 237 --- > 1.0 / 228 / 555 / 193  --> 2.8/301/463/159 --> 0.6 / 159 / 336 / 125 better S/P Charmaine  - Now off antibiotics  - Patient stable from GI point of view for discharge. Patient to follow up with Dr Ohara as follow up. Discussed with patient.
2.3 / 630 / 886 / 237 --- Transaminases significantly better but Bili / Alk phos slightly higher. ? retained stone VS cholestasis secondary to antibiotic 1) D/C antibiotic if possible. Doubt the necessity at present time from GI point of view 2) clear liquid diet if OK with surgery 3) f/u LFT
2.3 / 630 / 886 / 237 --- > 1.0 / 228 / 555 / 193 Transaminases / Bili  / alk phos significantly better C/W passed stone. Continue as per surgery

## 2019-07-07 NOTE — DISCHARGE NOTE PROVIDER - CARE PROVIDER_API CALL
Cong Randhawa)  Gastroenterology; Internal Medicine  [address], [address]  [place] [address]  Phone: [phone]  Fax: [phone]  Follow Up Time:     Nathan Ferrara)  Surgery  [address], [address]  [place] [address]  Phone: [phone]  Fax: [phone]  Follow Up Time:

## 2019-07-15 DIAGNOSIS — K80.67 CALCULUS OF GALLBLADDER AND BILE DUCT WITH ACUTE AND CHRONIC CHOLECYSTITIS WITH OBSTRUCTION: ICD-10-CM

## 2019-07-15 DIAGNOSIS — D72.819 DECREASED WHITE BLOOD CELL COUNT, UNSPECIFIED: ICD-10-CM

## 2019-07-15 DIAGNOSIS — K66.0 PERITONEAL ADHESIONS (POSTPROCEDURAL) (POSTINFECTION): ICD-10-CM

## 2019-07-15 DIAGNOSIS — K76.89 OTHER SPECIFIED DISEASES OF LIVER: ICD-10-CM

## 2019-07-15 DIAGNOSIS — I10 ESSENTIAL (PRIMARY) HYPERTENSION: ICD-10-CM

## 2019-07-15 DIAGNOSIS — K81.0 ACUTE CHOLECYSTITIS: ICD-10-CM

## 2019-07-15 DIAGNOSIS — K82.8 OTHER SPECIFIED DISEASES OF GALLBLADDER: ICD-10-CM

## 2019-07-15 PROBLEM — Z00.00 ENCOUNTER FOR PREVENTIVE HEALTH EXAMINATION: Status: ACTIVE | Noted: 2019-07-15

## 2019-07-22 ENCOUNTER — APPOINTMENT (OUTPATIENT)
Dept: SURGERY | Facility: CLINIC | Age: 69
End: 2019-07-22
Payer: MEDICAID

## 2019-07-22 DIAGNOSIS — Z09 ENCOUNTER FOR FOLLOW-UP EXAMINATION AFTER COMPLETED TREATMENT FOR CONDITIONS OTHER THAN MALIGNANT NEOPLASM: ICD-10-CM

## 2019-07-22 PROCEDURE — 99024 POSTOP FOLLOW-UP VISIT: CPT

## 2019-07-22 NOTE — PHYSICAL EXAM
[Respiratory Effort] : normal respiratory effort [Normal Heart Sounds] : normal heart sounds [de-identified] : NAD [de-identified] : EOM intact [de-identified] : Supple [de-identified] : Abdomen - soft, non-tender, non-distended, incisions clean and healing well.

## 2019-08-01 ENCOUNTER — OUTPATIENT (OUTPATIENT)
Dept: OUTPATIENT SERVICES | Facility: HOSPITAL | Age: 69
LOS: 1 days | End: 2019-08-01
Payer: MEDICAID

## 2019-08-01 PROCEDURE — G9001: CPT

## 2019-08-16 DIAGNOSIS — Z71.89 OTHER SPECIFIED COUNSELING: ICD-10-CM
